# Patient Record
Sex: MALE | Race: WHITE
[De-identification: names, ages, dates, MRNs, and addresses within clinical notes are randomized per-mention and may not be internally consistent; named-entity substitution may affect disease eponyms.]

---

## 2019-07-25 ENCOUNTER — APPOINTMENT (OUTPATIENT)
Age: 84
End: 2019-07-25
Payer: MEDICARE

## 2019-07-25 VITALS
DIASTOLIC BLOOD PRESSURE: 80 MMHG | WEIGHT: 190 LBS | OXYGEN SATURATION: 95 % | HEART RATE: 75 BPM | RESPIRATION RATE: 18 BRPM | HEIGHT: 69 IN | TEMPERATURE: 98.3 F | BODY MASS INDEX: 28.14 KG/M2 | SYSTOLIC BLOOD PRESSURE: 173 MMHG

## 2019-07-25 DIAGNOSIS — Z86.79 PERSONAL HISTORY OF OTHER DISEASES OF THE CIRCULATORY SYSTEM: ICD-10-CM

## 2019-07-25 DIAGNOSIS — Z78.9 OTHER SPECIFIED HEALTH STATUS: ICD-10-CM

## 2019-07-25 DIAGNOSIS — Z86.39 PERSONAL HISTORY OF OTHER ENDOCRINE, NUTRITIONAL AND METABOLIC DISEASE: ICD-10-CM

## 2019-07-25 DIAGNOSIS — R82.71 BACTERIURIA: ICD-10-CM

## 2019-07-25 PROCEDURE — 51798 US URINE CAPACITY MEASURE: CPT

## 2019-07-25 PROCEDURE — 99204 OFFICE O/P NEW MOD 45 MIN: CPT | Mod: 25

## 2019-08-02 LAB
APPEARANCE: CLEAR
BACTERIA UR CULT: NORMAL
BACTERIA: NEGATIVE
BILIRUBIN URINE: NEGATIVE
BLOOD URINE: ABNORMAL
COLOR: YELLOW
GLUCOSE QUALITATIVE U: NEGATIVE
HYALINE CASTS: 0 /LPF
KETONES URINE: NEGATIVE
LEUKOCYTE ESTERASE URINE: NEGATIVE
MICROSCOPIC-UA: NORMAL
NITRITE URINE: NEGATIVE
PH URINE: 6
PROTEIN URINE: ABNORMAL
RED BLOOD CELLS URINE: 83 /HPF
SPECIFIC GRAVITY URINE: 1.03
SQUAMOUS EPITHELIAL CELLS: 1 /HPF
UROBILINOGEN URINE: NORMAL
WHITE BLOOD CELLS URINE: 6 /HPF

## 2019-08-07 PROBLEM — Z86.79 HISTORY OF HYPERTENSION: Status: RESOLVED | Noted: 2019-08-07 | Resolved: 2019-08-07

## 2019-08-07 PROBLEM — Z86.39 HISTORY OF HYPERLIPIDEMIA: Status: RESOLVED | Noted: 2019-08-07 | Resolved: 2019-08-07

## 2019-08-07 PROBLEM — Z78.9 NON-SMOKER: Status: ACTIVE | Noted: 2019-08-07

## 2019-08-07 RX ORDER — SIMVASTATIN 80 MG/1
TABLET, FILM COATED ORAL
Refills: 0 | Status: ACTIVE | COMMUNITY

## 2019-08-07 RX ORDER — AMLODIPINE BESYLATE 5 MG/1
TABLET ORAL
Refills: 0 | Status: ACTIVE | COMMUNITY

## 2019-08-07 NOTE — ASSESSMENT
[FreeTextEntry1] : 83 yo M with recurrent UTI. Pt denies any acute or bothersome LUTS. No elevated PVR, no abnormalities on Renal US. Unclear why he had recurrent UTI. Given that this appears to be asymptomatic bacteriuria, would not recommend further intervention or treatment unless pt develops symptomatic infection. If symptomatic UTI occurs, will perform cystoscopy.

## 2019-08-07 NOTE — HISTORY OF PRESENT ILLNESS
[FreeTextEntry1] : 84 year old man seen 07/25/2019 with complaint of UTI. This began 6/03/19, when bacteriuria was discovered by PCP on UCx. Enterococcus found. Bacteriuria has recurred despite abx treatment. Renal US (8/1/19) was negative for kidney stones or hydronephrosis.\par It is mild in severity as he denies any bothersome LUTS. He reports mild frequency, intermittency, and weak stream but these LUTS are mild, unchanged, and not bothersome. Nothing makes the symptoms better, nothing makes sx worse. It is associated with nothing.\par No hematuria, no dysuria, no straining. No incontinence. No fevers, no chills, no nausea, no vomiting, no flank pain. PVR 0 mL.\par \par No family history contributory to bacteriuria/UTI.

## 2019-09-05 ENCOUNTER — APPOINTMENT (OUTPATIENT)
Age: 84
End: 2019-09-05
Payer: MEDICARE

## 2019-09-05 PROCEDURE — 99213 OFFICE O/P EST LOW 20 MIN: CPT

## 2019-09-05 NOTE — HISTORY OF PRESENT ILLNESS
[FreeTextEntry1] : 84 year old man seen 07/25/2019 with complaint of UTI. This began 6/03/19, when bacteriuria was discovered by PCP on UCx. Enterococcus found. Bacteriuria has recurred despite abx treatment. Renal US (8/1/19) was negative for kidney stones or hydronephrosis.\par It is mild in severity as he denies any bothersome LUTS. He reports mild frequency, intermittency, and weak stream but these LUTS are mild, unchanged, and not bothersome. Nothing makes the symptoms better, nothing makes sx worse. It is associated with nothing.\par No hematuria, no dysuria, no straining. No incontinence. No fevers, no chills, no nausea, no vomiting, no flank pain. PVR 0 mL.\par \par No family history contributory to bacteriuria/UTI. \par \par 09/05/2019: Patient presents for follow up. He reports he has no complaints at this time. No hematuria, no dysuria, no frequency, no urgency, no hesitancy, no straining. No incontinence. No fevers, no chills, no nausea, no vomiting, no flank pain.\par

## 2019-09-05 NOTE — ASSESSMENT
[FreeTextEntry1] : 85 yo M with recurrent UTI. Pt denies any acute or bothersome LUTS. No elevated PVR, no abnormalities on Renal US. Unclear why he had recurrent UTI. Given that this appears to be asymptomatic bacteriuria, would not recommend further intervention or treatment unless pt develops symptomatic infection. \par Last UA showed Microscopic hematuria, discussed with patient this may be secondary to bacteruria, will repeat UA/UCx today. If UA positive for hematuria then will proceed with CTU and cystoscopy. \par \par We discussed that the differential diagnosis includes both benign and malignant conditions including renal stones, urinary tract infections, and cancer of the bladder or ureter or kidney. Cystoscopy was recommended to rule out pathology in the bladder. CT Urogram was recommended to evaluate for presence of nephroureteral stones or malignancies. Urinalysis and urine culture were recommended to check for urinary tract infection. Pt agrees and understands. \par \par

## 2019-09-06 LAB
APPEARANCE: CLEAR
BACTERIA: NEGATIVE
BILIRUBIN URINE: NEGATIVE
BLOOD URINE: NORMAL
COLOR: YELLOW
GLUCOSE QUALITATIVE U: NEGATIVE
HYALINE CASTS: 3 /LPF
KETONES URINE: NORMAL
LEUKOCYTE ESTERASE URINE: ABNORMAL
MICROSCOPIC-UA: NORMAL
NITRITE URINE: NEGATIVE
PH URINE: 6
PROTEIN URINE: ABNORMAL
RED BLOOD CELLS URINE: 7 /HPF
SPECIFIC GRAVITY URINE: 1.03
SQUAMOUS EPITHELIAL CELLS: 4 /HPF
UROBILINOGEN URINE: NORMAL
WHITE BLOOD CELLS URINE: 47 /HPF

## 2019-09-12 DIAGNOSIS — Z00.00 ENCOUNTER FOR GENERAL ADULT MEDICAL EXAMINATION W/OUT ABNORMAL FINDINGS: ICD-10-CM

## 2019-09-17 LAB — BACTERIA UR CULT: ABNORMAL

## 2020-03-05 ENCOUNTER — APPOINTMENT (OUTPATIENT)
Age: 85
End: 2020-03-05

## 2021-08-19 ENCOUNTER — OUTPATIENT (OUTPATIENT)
Dept: OUTPATIENT SERVICES | Facility: HOSPITAL | Age: 86
LOS: 1 days | End: 2021-08-19
Payer: MEDICARE

## 2021-08-19 ENCOUNTER — APPOINTMENT (OUTPATIENT)
Dept: UROLOGY | Facility: CLINIC | Age: 86
End: 2021-08-19
Payer: MEDICARE

## 2021-08-19 ENCOUNTER — APPOINTMENT (OUTPATIENT)
Dept: CT IMAGING | Facility: CLINIC | Age: 86
End: 2021-08-19
Payer: MEDICARE

## 2021-08-19 DIAGNOSIS — N13.30 UNSPECIFIED HYDRONEPHROSIS: ICD-10-CM

## 2021-08-19 PROCEDURE — 99213 OFFICE O/P EST LOW 20 MIN: CPT

## 2021-08-19 PROCEDURE — 74176 CT ABD & PELVIS W/O CONTRAST: CPT | Mod: 26

## 2021-08-19 PROCEDURE — 74176 CT ABD & PELVIS W/O CONTRAST: CPT

## 2021-08-20 NOTE — HISTORY OF PRESENT ILLNESS
[FreeTextEntry1] : 84 year old man seen 07/25/2019 with complaint of UTI. This began 6/03/19, when bacteriuria was discovered by PCP on UCx. Enterococcus found. Bacteriuria has recurred despite abx treatment. Renal US (8/1/19) was negative for kidney stones or hydronephrosis.\par It is mild in severity as he denies any bothersome LUTS. He reports mild frequency, intermittency, and weak stream but these LUTS are mild, unchanged, and not bothersome. Nothing makes the symptoms better, nothing makes sx worse. It is associated with nothing.\par No hematuria, no dysuria, no straining. No incontinence. No fevers, no chills, no nausea, no vomiting, no flank pain. PVR 0 mL.\par \par No family history contributory to bacteriuria/UTI. \par \par 09/05/2019: Patient presents for follow up. He reports he has no complaints at this time. No hematuria, no dysuria, no frequency, no urgency, no hesitancy, no straining. No incontinence. No fevers, no chills, no nausea, no vomiting, no flank pain.\par \par 08/19/2021: Patient presents for follow up. He was LTFU for 2 years. He was sent by PCP due to RBUS showing RIGHT hydronephrosis. RBUS done due to Cr 2.19 up from normal baseline. He denies flank pain or other complaints.

## 2021-08-20 NOTE — ASSESSMENT
[FreeTextEntry1] : 85 yo M with h/o RIGHT ureteral stone, now with hydronephrosis seen on RBUS, elevated Cr. Pt sent for STAT CT to evaluate for stone or other ureteral obstruction. \par \par

## 2021-08-23 ENCOUNTER — NON-APPOINTMENT (OUTPATIENT)
Age: 86
End: 2021-08-23

## 2021-09-13 ENCOUNTER — OUTPATIENT (OUTPATIENT)
Dept: OUTPATIENT SERVICES | Facility: HOSPITAL | Age: 86
LOS: 1 days | End: 2021-09-13
Payer: MEDICARE

## 2021-09-13 ENCOUNTER — RESULT REVIEW (OUTPATIENT)
Age: 86
End: 2021-09-13

## 2021-09-13 VITALS
RESPIRATION RATE: 18 BRPM | OXYGEN SATURATION: 100 % | HEIGHT: 68 IN | WEIGHT: 181.88 LBS | HEART RATE: 68 BPM | TEMPERATURE: 98 F

## 2021-09-13 DIAGNOSIS — Z98.890 OTHER SPECIFIED POSTPROCEDURAL STATES: Chronic | ICD-10-CM

## 2021-09-13 DIAGNOSIS — N13.30 UNSPECIFIED HYDRONEPHROSIS: ICD-10-CM

## 2021-09-13 DIAGNOSIS — Z96.641 PRESENCE OF RIGHT ARTIFICIAL HIP JOINT: Chronic | ICD-10-CM

## 2021-09-13 DIAGNOSIS — Z01.818 ENCOUNTER FOR OTHER PREPROCEDURAL EXAMINATION: ICD-10-CM

## 2021-09-13 DIAGNOSIS — Z96.653 PRESENCE OF ARTIFICIAL KNEE JOINT, BILATERAL: Chronic | ICD-10-CM

## 2021-09-13 LAB
ANION GAP SERPL CALC-SCNC: 7 MMOL/L — SIGNIFICANT CHANGE UP (ref 5–17)
APPEARANCE UR: CLEAR — SIGNIFICANT CHANGE UP
APTT BLD: 31.5 SEC — SIGNIFICANT CHANGE UP (ref 27.5–35.5)
BASOPHILS # BLD AUTO: 0.06 K/UL — SIGNIFICANT CHANGE UP (ref 0–0.2)
BASOPHILS NFR BLD AUTO: 0.7 % — SIGNIFICANT CHANGE UP (ref 0–2)
BILIRUB UR-MCNC: NEGATIVE — SIGNIFICANT CHANGE UP
BUN SERPL-MCNC: 35 MG/DL — HIGH (ref 7–23)
CALCIUM SERPL-MCNC: 9.4 MG/DL — SIGNIFICANT CHANGE UP (ref 8.5–10.1)
CHLORIDE SERPL-SCNC: 107 MMOL/L — SIGNIFICANT CHANGE UP (ref 96–108)
CO2 SERPL-SCNC: 27 MMOL/L — SIGNIFICANT CHANGE UP (ref 22–31)
COLOR SPEC: YELLOW — SIGNIFICANT CHANGE UP
CREAT SERPL-MCNC: 1.87 MG/DL — HIGH (ref 0.5–1.3)
DIFF PNL FLD: ABNORMAL
EOSINOPHIL # BLD AUTO: 0.37 K/UL — SIGNIFICANT CHANGE UP (ref 0–0.5)
EOSINOPHIL NFR BLD AUTO: 4.2 % — SIGNIFICANT CHANGE UP (ref 0–6)
GLUCOSE SERPL-MCNC: 96 MG/DL — SIGNIFICANT CHANGE UP (ref 70–99)
GLUCOSE UR QL: NEGATIVE — SIGNIFICANT CHANGE UP
HCT VFR BLD CALC: 44.3 % — SIGNIFICANT CHANGE UP (ref 39–50)
HGB BLD-MCNC: 15.2 G/DL — SIGNIFICANT CHANGE UP (ref 13–17)
IMM GRANULOCYTES NFR BLD AUTO: 0.3 % — SIGNIFICANT CHANGE UP (ref 0–1.5)
INR BLD: 1.04 RATIO — SIGNIFICANT CHANGE UP (ref 0.88–1.16)
KETONES UR-MCNC: NEGATIVE — SIGNIFICANT CHANGE UP
LEUKOCYTE ESTERASE UR-ACNC: ABNORMAL
LYMPHOCYTES # BLD AUTO: 1.79 K/UL — SIGNIFICANT CHANGE UP (ref 1–3.3)
LYMPHOCYTES # BLD AUTO: 20.2 % — SIGNIFICANT CHANGE UP (ref 13–44)
MCHC RBC-ENTMCNC: 31.4 PG — SIGNIFICANT CHANGE UP (ref 27–34)
MCHC RBC-ENTMCNC: 34.3 GM/DL — SIGNIFICANT CHANGE UP (ref 32–36)
MCV RBC AUTO: 91.5 FL — SIGNIFICANT CHANGE UP (ref 80–100)
MONOCYTES # BLD AUTO: 0.68 K/UL — SIGNIFICANT CHANGE UP (ref 0–0.9)
MONOCYTES NFR BLD AUTO: 7.7 % — SIGNIFICANT CHANGE UP (ref 2–14)
NEUTROPHILS # BLD AUTO: 5.92 K/UL — SIGNIFICANT CHANGE UP (ref 1.8–7.4)
NEUTROPHILS NFR BLD AUTO: 66.9 % — SIGNIFICANT CHANGE UP (ref 43–77)
NITRITE UR-MCNC: NEGATIVE — SIGNIFICANT CHANGE UP
PH UR: 6 — SIGNIFICANT CHANGE UP (ref 5–8)
PLATELET # BLD AUTO: 253 K/UL — SIGNIFICANT CHANGE UP (ref 150–400)
POTASSIUM SERPL-MCNC: 4 MMOL/L — SIGNIFICANT CHANGE UP (ref 3.5–5.3)
POTASSIUM SERPL-SCNC: 4 MMOL/L — SIGNIFICANT CHANGE UP (ref 3.5–5.3)
PROT UR-MCNC: 30 MG/DL
PROTHROM AB SERPL-ACNC: 12 SEC — SIGNIFICANT CHANGE UP (ref 10.6–13.6)
RBC # BLD: 4.84 M/UL — SIGNIFICANT CHANGE UP (ref 4.2–5.8)
RBC # FLD: 13.4 % — SIGNIFICANT CHANGE UP (ref 10.3–14.5)
SODIUM SERPL-SCNC: 141 MMOL/L — SIGNIFICANT CHANGE UP (ref 135–145)
SP GR SPEC: 1.02 — SIGNIFICANT CHANGE UP (ref 1.01–1.02)
UROBILINOGEN FLD QL: NEGATIVE — SIGNIFICANT CHANGE UP
WBC # BLD: 8.85 K/UL — SIGNIFICANT CHANGE UP (ref 3.8–10.5)
WBC # FLD AUTO: 8.85 K/UL — SIGNIFICANT CHANGE UP (ref 3.8–10.5)

## 2021-09-13 PROCEDURE — 80048 BASIC METABOLIC PNL TOTAL CA: CPT

## 2021-09-13 PROCEDURE — 86900 BLOOD TYPING SEROLOGIC ABO: CPT

## 2021-09-13 PROCEDURE — 87186 SC STD MICRODIL/AGAR DIL: CPT

## 2021-09-13 PROCEDURE — 86901 BLOOD TYPING SEROLOGIC RH(D): CPT

## 2021-09-13 PROCEDURE — 85610 PROTHROMBIN TIME: CPT

## 2021-09-13 PROCEDURE — G0463: CPT | Mod: 25

## 2021-09-13 PROCEDURE — 36415 COLL VENOUS BLD VENIPUNCTURE: CPT

## 2021-09-13 PROCEDURE — 86850 RBC ANTIBODY SCREEN: CPT

## 2021-09-13 PROCEDURE — 71046 X-RAY EXAM CHEST 2 VIEWS: CPT

## 2021-09-13 PROCEDURE — 81001 URINALYSIS AUTO W/SCOPE: CPT

## 2021-09-13 PROCEDURE — 85025 COMPLETE CBC W/AUTO DIFF WBC: CPT

## 2021-09-13 PROCEDURE — 85730 THROMBOPLASTIN TIME PARTIAL: CPT

## 2021-09-13 PROCEDURE — 87086 URINE CULTURE/COLONY COUNT: CPT

## 2021-09-13 PROCEDURE — 71046 X-RAY EXAM CHEST 2 VIEWS: CPT | Mod: 26

## 2021-09-13 NOTE — H&P PST ADULT - ASSESSMENT
86 year old man presents to PST for preprocedure exam. Patient is for planned cystoscopy, right ureteroscopy, possible biopsy, possible ureteral dilatation, right ureteral stent placement for kidney stones with Dr Chapman on 9/24.       Plan:  - PST instructions given ; NPO status instructions to be given by ASU .  - Pt instructed to take following meds with sip of water : amlodipine   - Pt instructed to take routine evening medications unless indicated .  -  Stop NSAIDS ( Aspirin Alev Motrin Mobic Diclofenac), herbal supplements , MVI , Vitamin fish oil 7 days prior to surgery  unless   directed by surgeon or cardiologist;   - Medical Optimization  with Dr Chapman   - EZ wash instructions given  - Labs EKG CXR as per surgeon request.   -  Pt instructed to self quarantine .  - Covid Testing scheduled

## 2021-09-13 NOTE — H&P PST ADULT - REASON FOR ADMISSION
cystoscopy, right ureteroscopy, possible biopsy, possible ureteral dilatation, right ureteral stent placement for kidney stones

## 2021-09-13 NOTE — H&P PST ADULT - NSICDXPASTSURGICALHX_GEN_ALL_CORE_FT
PAST SURGICAL HISTORY:  History of bilateral knee replacement     History of transurethral prostatectomy     S/P hip replacement, right

## 2021-09-14 DIAGNOSIS — Z01.818 ENCOUNTER FOR OTHER PREPROCEDURAL EXAMINATION: ICD-10-CM

## 2021-09-14 DIAGNOSIS — N13.30 UNSPECIFIED HYDRONEPHROSIS: ICD-10-CM

## 2021-09-20 ENCOUNTER — NON-APPOINTMENT (OUTPATIENT)
Age: 86
End: 2021-09-20

## 2021-09-21 ENCOUNTER — APPOINTMENT (OUTPATIENT)
Dept: DISASTER EMERGENCY | Facility: CLINIC | Age: 86
End: 2021-09-21

## 2021-09-21 DIAGNOSIS — Z01.818 ENCOUNTER FOR OTHER PREPROCEDURAL EXAMINATION: ICD-10-CM

## 2021-09-22 LAB — SARS-COV-2 N GENE NPH QL NAA+PROBE: NOT DETECTED

## 2021-09-23 RX ORDER — OXYCODONE HYDROCHLORIDE 5 MG/1
5 TABLET ORAL ONCE
Refills: 0 | Status: DISCONTINUED | OUTPATIENT
Start: 2021-09-24 | End: 2021-09-24

## 2021-09-23 RX ORDER — ONDANSETRON 8 MG/1
4 TABLET, FILM COATED ORAL ONCE
Refills: 0 | Status: DISCONTINUED | OUTPATIENT
Start: 2021-09-24 | End: 2021-09-24

## 2021-09-23 RX ORDER — FENTANYL CITRATE 50 UG/ML
50 INJECTION INTRAVENOUS
Refills: 0 | Status: DISCONTINUED | OUTPATIENT
Start: 2021-09-24 | End: 2021-09-24

## 2021-09-23 RX ORDER — SODIUM CHLORIDE 9 MG/ML
1000 INJECTION, SOLUTION INTRAVENOUS
Refills: 0 | Status: DISCONTINUED | OUTPATIENT
Start: 2021-09-24 | End: 2021-09-24

## 2021-09-24 ENCOUNTER — APPOINTMENT (OUTPATIENT)
Dept: UROLOGY | Facility: HOSPITAL | Age: 86
End: 2021-09-24

## 2021-09-24 ENCOUNTER — OUTPATIENT (OUTPATIENT)
Dept: INPATIENT UNIT | Facility: HOSPITAL | Age: 86
LOS: 1 days | Discharge: HOME | End: 2021-09-24
Payer: MEDICARE

## 2021-09-24 VITALS
HEART RATE: 70 BPM | RESPIRATION RATE: 14 BRPM | WEIGHT: 180.78 LBS | TEMPERATURE: 98 F | SYSTOLIC BLOOD PRESSURE: 171 MMHG | DIASTOLIC BLOOD PRESSURE: 84 MMHG | OXYGEN SATURATION: 99 % | HEIGHT: 68 IN

## 2021-09-24 VITALS
DIASTOLIC BLOOD PRESSURE: 84 MMHG | SYSTOLIC BLOOD PRESSURE: 170 MMHG | OXYGEN SATURATION: 97 % | TEMPERATURE: 98 F | RESPIRATION RATE: 16 BRPM | HEART RATE: 664 BPM

## 2021-09-24 DIAGNOSIS — Z96.641 PRESENCE OF RIGHT ARTIFICIAL HIP JOINT: Chronic | ICD-10-CM

## 2021-09-24 DIAGNOSIS — N13.30 UNSPECIFIED HYDRONEPHROSIS: ICD-10-CM

## 2021-09-24 DIAGNOSIS — N39.0 URINARY TRACT INFECTION, SITE NOT SPECIFIED: ICD-10-CM

## 2021-09-24 DIAGNOSIS — Z96.653 PRESENCE OF ARTIFICIAL KNEE JOINT, BILATERAL: Chronic | ICD-10-CM

## 2021-09-24 DIAGNOSIS — Z98.890 OTHER SPECIFIED POSTPROCEDURAL STATES: Chronic | ICD-10-CM

## 2021-09-24 PROCEDURE — C2617: CPT

## 2021-09-24 PROCEDURE — 74420 UROGRAPHY RTRGR +-KUB: CPT | Mod: 26,RT

## 2021-09-24 PROCEDURE — 76000 FLUOROSCOPY <1 HR PHYS/QHP: CPT

## 2021-09-24 PROCEDURE — 52332 CYSTOSCOPY AND TREATMENT: CPT | Mod: RT

## 2021-09-24 PROCEDURE — C1769: CPT

## 2021-09-24 PROCEDURE — 52351 CYSTOURETERO & OR PYELOSCOPE: CPT | Mod: RT

## 2021-09-24 RX ORDER — CIPROFLOXACIN LACTATE 400MG/40ML
1 VIAL (ML) INTRAVENOUS
Qty: 10 | Refills: 0
Start: 2021-09-24 | End: 2021-09-28

## 2021-09-24 RX ORDER — TRAMADOL HYDROCHLORIDE 50 MG/1
1 TABLET ORAL
Qty: 12 | Refills: 0
Start: 2021-09-24 | End: 2021-09-26

## 2021-09-24 RX ORDER — SODIUM CHLORIDE 9 MG/ML
3 INJECTION INTRAMUSCULAR; INTRAVENOUS; SUBCUTANEOUS EVERY 8 HOURS
Refills: 0 | Status: DISCONTINUED | OUTPATIENT
Start: 2021-09-24 | End: 2021-09-24

## 2021-09-24 RX ORDER — TAMSULOSIN HYDROCHLORIDE 0.4 MG/1
1 CAPSULE ORAL
Qty: 30 | Refills: 0
Start: 2021-09-24 | End: 2021-10-23

## 2021-09-24 RX ORDER — FAMOTIDINE 10 MG/ML
20 INJECTION INTRAVENOUS ONCE
Refills: 0 | Status: COMPLETED | OUTPATIENT
Start: 2021-09-24 | End: 2021-09-24

## 2021-09-24 RX ORDER — ACETAMINOPHEN 500 MG
975 TABLET ORAL ONCE
Refills: 0 | Status: COMPLETED | OUTPATIENT
Start: 2021-09-24 | End: 2021-09-24

## 2021-09-24 RX ORDER — PHENAZOPYRIDINE HCL 100 MG
200 TABLET ORAL ONCE
Refills: 0 | Status: COMPLETED | OUTPATIENT
Start: 2021-09-24 | End: 2021-09-24

## 2021-09-24 RX ADMIN — FAMOTIDINE 20 MILLIGRAM(S): 10 INJECTION INTRAVENOUS at 11:32

## 2021-09-24 RX ADMIN — Medication 975 MILLIGRAM(S): at 11:32

## 2021-09-24 RX ADMIN — Medication 200 MILLIGRAM(S): at 14:10

## 2021-09-24 NOTE — ASU PATIENT PROFILE, ADULT - NSICDXPASTMEDICALHX_GEN_ALL_CORE_FT
PAST MEDICAL HISTORY:  COVID-19 vaccine series completed moderna- march 2021    Hypercholesteremia     Hypertension     White coat syndrome with hypertension

## 2021-09-24 NOTE — ASU DISCHARGE PLAN (ADULT/PEDIATRIC) - CARE PROVIDER_API CALL
Thomas Chapman)  Urology  [address], [address] [place] [address]  Phone: [phone]  Fax: [phone]  Follow Up Time: 1 week

## 2021-09-24 NOTE — BRIEF OPERATIVE NOTE - NSICDXBRIEFPROCEDURE_GEN_ALL_CORE_FT
PROCEDURES:  Ureteroscopy 24-Sep-2021 13:27:31  Thomas Chapman  Cystoscopy with stent placement 24-Sep-2021 13:27:38  Thomas Chapman  Retrograde pyelogram 24-Sep-2021 13:27:50  Thomas Chapman

## 2021-09-28 ENCOUNTER — APPOINTMENT (OUTPATIENT)
Dept: UROLOGY | Facility: CLINIC | Age: 86
End: 2021-09-28
Payer: MEDICARE

## 2021-09-28 VITALS
HEART RATE: 78 BPM | SYSTOLIC BLOOD PRESSURE: 146 MMHG | TEMPERATURE: 97.7 F | OXYGEN SATURATION: 100 % | DIASTOLIC BLOOD PRESSURE: 77 MMHG

## 2021-09-28 PROBLEM — Z92.29 PERSONAL HISTORY OF OTHER DRUG THERAPY: Chronic | Status: ACTIVE | Noted: 2021-09-13

## 2021-09-28 PROBLEM — E78.00 PURE HYPERCHOLESTEROLEMIA, UNSPECIFIED: Chronic | Status: ACTIVE | Noted: 2021-09-13

## 2021-09-28 PROBLEM — I10 ESSENTIAL (PRIMARY) HYPERTENSION: Chronic | Status: ACTIVE | Noted: 2021-09-13

## 2021-09-28 PROCEDURE — 99213 OFFICE O/P EST LOW 20 MIN: CPT

## 2021-09-28 NOTE — HISTORY OF PRESENT ILLNESS
[FreeTextEntry1] : 84 year old man seen 07/25/2019 with complaint of UTI. This began 6/03/19, when bacteriuria was discovered by PCP on UCx. Enterococcus found. Bacteriuria has recurred despite abx treatment. Renal US (8/1/19) was negative for kidney stones or hydronephrosis.\par It is mild in severity as he denies any bothersome LUTS. He reports mild frequency, intermittency, and weak stream but these LUTS are mild, unchanged, and not bothersome. Nothing makes the symptoms better, nothing makes sx worse. It is associated with nothing.\par No hematuria, no dysuria, no straining. No incontinence. No fevers, no chills, no nausea, no vomiting, no flank pain. PVR 0 mL.\par \par No family history contributory to bacteriuria/UTI. \par \par 09/05/2019: Patient presents for follow up. He reports he has no complaints at this time. No hematuria, no dysuria, no frequency, no urgency, no hesitancy, no straining. No incontinence. No fevers, no chills, no nausea, no vomiting, no flank pain.\par \par 08/19/2021: Patient presents for follow up. He was LTFU for 2 years. He was sent by PCP due to RBUS showing RIGHT hydronephrosis. RBUS done due to Cr 2.19 up from normal baseline. He denies flank pain or other complaints.  \par \par 09/28/2021: Patient presents for follow up. he was taken to OR for possible RIGHT ureteroscopy, ureteral dilation but ureteral stricture was too tight to allow the passage of scope. RIGHT ureteral stent was placed. He reports some gross hematuria as well as urgency. Otherwise doing well post op.

## 2021-09-28 NOTE — ASSESSMENT
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 Discussed treatment options include continued management with indwelling stents and routine changes, or reattempt at dilation. Pt agrees to ureteroscopy, laser or dilation treatment of ureteral stricture but he would like to wait a few months. WIll plan for 3 months. Will give samples of myrbetriq 50 mg and will Rx if pt finds it helpful for urgency. RTO in 6 weeks for sx check. \par \par

## 2021-10-01 DIAGNOSIS — Z90.79 ACQUIRED ABSENCE OF OTHER GENITAL ORGAN(S): ICD-10-CM

## 2021-10-01 DIAGNOSIS — E78.5 HYPERLIPIDEMIA, UNSPECIFIED: ICD-10-CM

## 2021-10-01 DIAGNOSIS — I77.9 DISORDER OF ARTERIES AND ARTERIOLES, UNSPECIFIED: ICD-10-CM

## 2021-10-01 DIAGNOSIS — Z96.649 PRESENCE OF UNSPECIFIED ARTIFICIAL HIP JOINT: ICD-10-CM

## 2021-10-01 DIAGNOSIS — N13.1 HYDRONEPHROSIS WITH URETERAL STRICTURE, NOT ELSEWHERE CLASSIFIED: ICD-10-CM

## 2021-10-01 DIAGNOSIS — Z96.653 PRESENCE OF ARTIFICIAL KNEE JOINT, BILATERAL: ICD-10-CM

## 2021-10-01 DIAGNOSIS — I10 ESSENTIAL (PRIMARY) HYPERTENSION: ICD-10-CM

## 2021-10-01 DIAGNOSIS — Z79.82 LONG TERM (CURRENT) USE OF ASPIRIN: ICD-10-CM

## 2021-10-01 DIAGNOSIS — N40.0 BENIGN PROSTATIC HYPERPLASIA WITHOUT LOWER URINARY TRACT SYMPTOMS: ICD-10-CM

## 2021-11-10 ENCOUNTER — NON-APPOINTMENT (OUTPATIENT)
Age: 86
End: 2021-11-10

## 2021-11-18 ENCOUNTER — APPOINTMENT (OUTPATIENT)
Dept: UROLOGY | Facility: CLINIC | Age: 86
End: 2021-11-18
Payer: MEDICARE

## 2021-11-18 DIAGNOSIS — N13.30 UNSPECIFIED HYDRONEPHROSIS: ICD-10-CM

## 2021-11-18 PROCEDURE — 99213 OFFICE O/P EST LOW 20 MIN: CPT

## 2021-11-18 RX ORDER — NITROFURANTOIN (MONOHYDRATE/MACROCRYSTALS) 25; 75 MG/1; MG/1
100 CAPSULE ORAL
Qty: 14 | Refills: 3 | Status: ACTIVE | COMMUNITY
Start: 2019-09-12 | End: 1900-01-01

## 2021-11-19 PROBLEM — N13.30 HYDRONEPHROSIS, RIGHT: Status: ACTIVE | Noted: 2021-08-19

## 2021-11-19 NOTE — HISTORY OF PRESENT ILLNESS
[FreeTextEntry1] : 84 year old man seen 07/25/2019 with complaint of UTI. This began 6/03/19, when bacteriuria was discovered by PCP on UCx. Enterococcus found. Bacteriuria has recurred despite abx treatment. Renal US (8/1/19) was negative for kidney stones or hydronephrosis.\par It is mild in severity as he denies any bothersome LUTS. He reports mild frequency, intermittency, and weak stream but these LUTS are mild, unchanged, and not bothersome. Nothing makes the symptoms better, nothing makes sx worse. It is associated with nothing.\par No hematuria, no dysuria, no straining. No incontinence. No fevers, no chills, no nausea, no vomiting, no flank pain. PVR 0 mL.\par \par No family history contributory to bacteriuria/UTI. \par \par 09/05/2019: Patient presents for follow up. He reports he has no complaints at this time. No hematuria, no dysuria, no frequency, no urgency, no hesitancy, no straining. No incontinence. No fevers, no chills, no nausea, no vomiting, no flank pain.\par \par 08/19/2021: Patient presents for follow up. He was LTFU for 2 years. He was sent by PCP due to RBUS showing RIGHT hydronephrosis. RBUS done due to Cr 2.19 up from normal baseline. He denies flank pain or other complaints.  \par \par 09/28/2021: Patient presents for follow up. he was taken to OR for possible RIGHT ureteroscopy, ureteral dilation but ureteral stricture was too tight to allow the passage of scope. RIGHT ureteral stent was placed. He reports some gross hematuria as well as urgency. Otherwise doing well post op. \par \par 11/18/2021: Patient presents for follow up. He reports urgency. UCx was positive. Serum Cr baseline. No new complaints.

## 2021-11-19 NOTE — ASSESSMENT
[FreeTextEntry1] : 87 yo M with h/o RIGHT ureteral stone, now with hydronephrosis and RIGHT ureteral stricture s/p RIGHT ureteral stent placement. Discussed treatment options include continued management with indwelling stents and routine changes, or reattempt at dilation. Pt agrees to ureteroscopy, laser or dilation treatment of ureteral stricture. Will book. \par \par For UTI, will Rx abx.

## 2021-11-22 ENCOUNTER — NON-APPOINTMENT (OUTPATIENT)
Age: 86
End: 2021-11-22

## 2021-12-01 ENCOUNTER — NON-APPOINTMENT (OUTPATIENT)
Age: 86
End: 2021-12-01

## 2021-12-01 PROBLEM — N39.0 RECURRENT UTI (URINARY TRACT INFECTION): Status: ACTIVE | Noted: 2019-07-25

## 2021-12-13 ENCOUNTER — NON-APPOINTMENT (OUTPATIENT)
Age: 86
End: 2021-12-13

## 2022-01-24 ENCOUNTER — OUTPATIENT (OUTPATIENT)
Dept: OUTPATIENT SERVICES | Facility: HOSPITAL | Age: 87
LOS: 1 days | End: 2022-01-24
Payer: COMMERCIAL

## 2022-01-24 VITALS
TEMPERATURE: 98 F | HEIGHT: 66 IN | RESPIRATION RATE: 16 BRPM | SYSTOLIC BLOOD PRESSURE: 163 MMHG | OXYGEN SATURATION: 99 % | HEART RATE: 73 BPM | WEIGHT: 174.17 LBS | DIASTOLIC BLOOD PRESSURE: 77 MMHG

## 2022-01-24 DIAGNOSIS — H26.9 UNSPECIFIED CATARACT: Chronic | ICD-10-CM

## 2022-01-24 DIAGNOSIS — Z98.890 OTHER SPECIFIED POSTPROCEDURAL STATES: Chronic | ICD-10-CM

## 2022-01-24 DIAGNOSIS — N13.30 UNSPECIFIED HYDRONEPHROSIS: ICD-10-CM

## 2022-01-24 DIAGNOSIS — Z96.641 PRESENCE OF RIGHT ARTIFICIAL HIP JOINT: Chronic | ICD-10-CM

## 2022-01-24 DIAGNOSIS — Z01.818 ENCOUNTER FOR OTHER PREPROCEDURAL EXAMINATION: ICD-10-CM

## 2022-01-24 DIAGNOSIS — Z96.653 PRESENCE OF ARTIFICIAL KNEE JOINT, BILATERAL: Chronic | ICD-10-CM

## 2022-01-24 LAB
ANION GAP SERPL CALC-SCNC: 5 MMOL/L — SIGNIFICANT CHANGE UP (ref 5–17)
APPEARANCE UR: CLEAR — SIGNIFICANT CHANGE UP
APTT BLD: 32.4 SEC — SIGNIFICANT CHANGE UP (ref 27.5–35.5)
BASOPHILS # BLD AUTO: 0.05 K/UL — SIGNIFICANT CHANGE UP (ref 0–0.2)
BASOPHILS NFR BLD AUTO: 0.7 % — SIGNIFICANT CHANGE UP (ref 0–2)
BILIRUB UR-MCNC: NEGATIVE — SIGNIFICANT CHANGE UP
BUN SERPL-MCNC: 29 MG/DL — HIGH (ref 7–23)
CALCIUM SERPL-MCNC: 9.1 MG/DL — SIGNIFICANT CHANGE UP (ref 8.5–10.1)
CHLORIDE SERPL-SCNC: 109 MMOL/L — HIGH (ref 96–108)
CO2 SERPL-SCNC: 26 MMOL/L — SIGNIFICANT CHANGE UP (ref 22–31)
COLOR SPEC: YELLOW — SIGNIFICANT CHANGE UP
CREAT SERPL-MCNC: 1.85 MG/DL — HIGH (ref 0.5–1.3)
DIFF PNL FLD: ABNORMAL
EOSINOPHIL # BLD AUTO: 0.34 K/UL — SIGNIFICANT CHANGE UP (ref 0–0.5)
EOSINOPHIL NFR BLD AUTO: 4.4 % — SIGNIFICANT CHANGE UP (ref 0–6)
GLUCOSE SERPL-MCNC: 118 MG/DL — HIGH (ref 70–99)
GLUCOSE UR QL: NEGATIVE — SIGNIFICANT CHANGE UP
HCT VFR BLD CALC: 41.4 % — SIGNIFICANT CHANGE UP (ref 39–50)
HGB BLD-MCNC: 13.5 G/DL — SIGNIFICANT CHANGE UP (ref 13–17)
IMM GRANULOCYTES NFR BLD AUTO: 0.1 % — SIGNIFICANT CHANGE UP (ref 0–1.5)
INR BLD: 1.04 RATIO — SIGNIFICANT CHANGE UP (ref 0.88–1.16)
KETONES UR-MCNC: NEGATIVE — SIGNIFICANT CHANGE UP
LEUKOCYTE ESTERASE UR-ACNC: ABNORMAL
LYMPHOCYTES # BLD AUTO: 1.28 K/UL — SIGNIFICANT CHANGE UP (ref 1–3.3)
LYMPHOCYTES # BLD AUTO: 16.7 % — SIGNIFICANT CHANGE UP (ref 13–44)
MCHC RBC-ENTMCNC: 30.2 PG — SIGNIFICANT CHANGE UP (ref 27–34)
MCHC RBC-ENTMCNC: 32.6 GM/DL — SIGNIFICANT CHANGE UP (ref 32–36)
MCV RBC AUTO: 92.6 FL — SIGNIFICANT CHANGE UP (ref 80–100)
MONOCYTES # BLD AUTO: 0.68 K/UL — SIGNIFICANT CHANGE UP (ref 0–0.9)
MONOCYTES NFR BLD AUTO: 8.9 % — SIGNIFICANT CHANGE UP (ref 2–14)
NEUTROPHILS # BLD AUTO: 5.32 K/UL — SIGNIFICANT CHANGE UP (ref 1.8–7.4)
NEUTROPHILS NFR BLD AUTO: 69.2 % — SIGNIFICANT CHANGE UP (ref 43–77)
NITRITE UR-MCNC: NEGATIVE — SIGNIFICANT CHANGE UP
PH UR: 7 — SIGNIFICANT CHANGE UP (ref 5–8)
PLATELET # BLD AUTO: 334 K/UL — SIGNIFICANT CHANGE UP (ref 150–400)
POTASSIUM SERPL-MCNC: 3.7 MMOL/L — SIGNIFICANT CHANGE UP (ref 3.5–5.3)
POTASSIUM SERPL-SCNC: 3.7 MMOL/L — SIGNIFICANT CHANGE UP (ref 3.5–5.3)
PROT UR-MCNC: 100
PROTHROM AB SERPL-ACNC: 12 SEC — SIGNIFICANT CHANGE UP (ref 10.6–13.6)
RBC # BLD: 4.47 M/UL — SIGNIFICANT CHANGE UP (ref 4.2–5.8)
RBC # FLD: 13.2 % — SIGNIFICANT CHANGE UP (ref 10.3–14.5)
SODIUM SERPL-SCNC: 140 MMOL/L — SIGNIFICANT CHANGE UP (ref 135–145)
SP GR SPEC: 1.01 — SIGNIFICANT CHANGE UP (ref 1.01–1.02)
UROBILINOGEN FLD QL: NEGATIVE — SIGNIFICANT CHANGE UP
WBC # BLD: 7.68 K/UL — SIGNIFICANT CHANGE UP (ref 3.8–10.5)
WBC # FLD AUTO: 7.68 K/UL — SIGNIFICANT CHANGE UP (ref 3.8–10.5)

## 2022-01-24 PROCEDURE — 93005 ELECTROCARDIOGRAM TRACING: CPT

## 2022-01-24 PROCEDURE — 85025 COMPLETE CBC W/AUTO DIFF WBC: CPT

## 2022-01-24 PROCEDURE — 86850 RBC ANTIBODY SCREEN: CPT

## 2022-01-24 PROCEDURE — 36415 COLL VENOUS BLD VENIPUNCTURE: CPT

## 2022-01-24 PROCEDURE — 93010 ELECTROCARDIOGRAM REPORT: CPT

## 2022-01-24 PROCEDURE — 85730 THROMBOPLASTIN TIME PARTIAL: CPT

## 2022-01-24 PROCEDURE — 85610 PROTHROMBIN TIME: CPT

## 2022-01-24 PROCEDURE — 86900 BLOOD TYPING SEROLOGIC ABO: CPT

## 2022-01-24 PROCEDURE — 87186 SC STD MICRODIL/AGAR DIL: CPT

## 2022-01-24 PROCEDURE — 81001 URINALYSIS AUTO W/SCOPE: CPT

## 2022-01-24 PROCEDURE — G0463: CPT | Mod: 25

## 2022-01-24 PROCEDURE — 80048 BASIC METABOLIC PNL TOTAL CA: CPT

## 2022-01-24 PROCEDURE — 86901 BLOOD TYPING SEROLOGIC RH(D): CPT

## 2022-01-24 PROCEDURE — 87086 URINE CULTURE/COLONY COUNT: CPT

## 2022-01-24 NOTE — H&P PST ADULT - HISTORY OF PRESENT ILLNESS
87 y.o WD, WN male presents to PST with hx of right ureteral stricture. Patient reports his PCP noted decreased kidney function during annual physical.  87 y.o WD, WN male presents to PST with hx of right ureteral stricture. Patient reports his PCP noted decreased kidney function during annual physical. He has followed with urology and states diagnostics revealed a "narrowing" of his right ureter. He had a cystoscopy with attempted laser of stricture and stent placement. Patient currently denies dysuria or hematuria. He is now scheduled for Cystoscopy, Right Ureteroscopy, Laser Incision of Ureteral Stricture, Balloon Dilatation of Ureteral Stricture, Right Ureteral Stent Exchange  87 y.o WD, WN male presents to PST with hx of right ureteral stricture. Patient reports recurrent UTI's and  decreased kidney function noted by his PCP . He has followed with urology and states diagnostics revealed a "narrowing" of his right ureter. He had a cystoscopy with attempted laser of stricture and stent placement. Patient currently denies dysuria or hematuria. He is now scheduled for Cystoscopy, Right Ureteroscopy, Laser Incision of Ureteral Stricture, Balloon Dilatation of Ureteral Stricture, Right Ureteral Stent Exchange  87 y.o WD, WN male presents to PST with hx of right ureteral stricture. Patient reports recurrent UTI's and  decreased kidney function noted by his PCP . He has followed with urology and states diagnostics revealed a "narrowing" of his right ureter. He had a cystoscopy with attempted laser of stricture and stent placement in September. Patient currently denies dysuria or hematuria. He is now scheduled for Cystoscopy, Right Ureteroscopy, Laser Incision of Ureteral Stricture, Balloon Dilatation of Ureteral Stricture, Right Ureteral Stent Exchange

## 2022-01-24 NOTE — H&P PST ADULT - NSICDXPASTMEDICALHX_GEN_ALL_CORE_FT
PAST MEDICAL HISTORY:  COVID-19 vaccine series completed moderna- march 2021    Hypercholesteremia     Hypertension     Ureteral stricture, right      PAST MEDICAL HISTORY:  Arthritis     COVID-19 vaccine series completed moderna- march 2021    Hypercholesteremia     Hypertension     Ureteral stricture, right

## 2022-01-24 NOTE — H&P PST ADULT - NSICDXPASTSURGICALHX_GEN_ALL_CORE_FT
PAST SURGICAL HISTORY:  Bilateral cataracts     History of bilateral knee replacement 2006, 2008    History of cystoscopy Right Ureteroscopy, stent insertion ---9/2021    History of strabismus surgery Bilateral as a child    History of transurethral prostatectomy 2010    S/P hip replacement, right 2010

## 2022-01-24 NOTE — H&P PST ADULT - NSICDXFAMILYHX_GEN_ALL_CORE_FT
FAMILY HISTORY:  FH: lung cancer, Age 63,     Mother  Still living? Unknown  FH: breast cancer, Age at diagnosis: Age Unknown

## 2022-01-24 NOTE — H&P PST ADULT - ASSESSMENT
87 y.o male scheduled for  87 y.o male scheduled for  Cystoscopy, Right Ureteroscopy, Laser Incision of Ureteral Stricture, Balloon Dilatation of Ureteral Stricture, Right Ureteral Stent Exchange   Plan  1. Stop all NSAIDS, herbal supplements and vitamins for 7 days.  2. NPO at midnight.  3. Take the following medications Norvasc with small sips of water on the morning of your procedure/surgery.  4. Labs, EKG, CXR  as per surgeon  5. PMD/Cardiology Javier Chapman  visit for optimization prior to surgery as per surgeon  6. COVID swab appt: 1/30/2022

## 2022-01-25 DIAGNOSIS — Z01.818 ENCOUNTER FOR OTHER PREPROCEDURAL EXAMINATION: ICD-10-CM

## 2022-01-25 DIAGNOSIS — N13.30 UNSPECIFIED HYDRONEPHROSIS: ICD-10-CM

## 2022-01-26 ENCOUNTER — NON-APPOINTMENT (OUTPATIENT)
Age: 87
End: 2022-01-26

## 2022-01-28 ENCOUNTER — NON-APPOINTMENT (OUTPATIENT)
Age: 87
End: 2022-01-28

## 2022-01-28 RX ORDER — LEVOFLOXACIN 250 MG/1
250 TABLET, FILM COATED ORAL DAILY
Qty: 7 | Refills: 0 | Status: ACTIVE | COMMUNITY
Start: 2021-12-13 | End: 1900-01-01

## 2022-02-02 ENCOUNTER — APPOINTMENT (OUTPATIENT)
Dept: UROLOGY | Facility: HOSPITAL | Age: 87
End: 2022-02-02

## 2022-02-02 ENCOUNTER — OUTPATIENT (OUTPATIENT)
Dept: INPATIENT UNIT | Facility: HOSPITAL | Age: 87
LOS: 1 days | Discharge: HOME | End: 2022-02-02
Payer: MEDICARE

## 2022-02-02 VITALS
TEMPERATURE: 98 F | DIASTOLIC BLOOD PRESSURE: 71 MMHG | HEART RATE: 74 BPM | RESPIRATION RATE: 14 BRPM | SYSTOLIC BLOOD PRESSURE: 154 MMHG | OXYGEN SATURATION: 96 %

## 2022-02-02 VITALS
SYSTOLIC BLOOD PRESSURE: 160 MMHG | WEIGHT: 174.17 LBS | TEMPERATURE: 97 F | OXYGEN SATURATION: 100 % | DIASTOLIC BLOOD PRESSURE: 85 MMHG | HEART RATE: 72 BPM | HEIGHT: 66 IN | RESPIRATION RATE: 16 BRPM

## 2022-02-02 DIAGNOSIS — N13.30 UNSPECIFIED HYDRONEPHROSIS: ICD-10-CM

## 2022-02-02 DIAGNOSIS — Z96.653 PRESENCE OF ARTIFICIAL KNEE JOINT, BILATERAL: Chronic | ICD-10-CM

## 2022-02-02 DIAGNOSIS — Z98.890 OTHER SPECIFIED POSTPROCEDURAL STATES: Chronic | ICD-10-CM

## 2022-02-02 DIAGNOSIS — H26.9 UNSPECIFIED CATARACT: Chronic | ICD-10-CM

## 2022-02-02 DIAGNOSIS — Z96.641 PRESENCE OF RIGHT ARTIFICIAL HIP JOINT: Chronic | ICD-10-CM

## 2022-02-02 DIAGNOSIS — N13.5 CROSSING VESSEL AND STRICTURE OF URETER WITHOUT HYDRONEPHROSIS: ICD-10-CM

## 2022-02-02 PROCEDURE — C2617: CPT

## 2022-02-02 PROCEDURE — 74420 UROGRAPHY RTRGR +-KUB: CPT | Mod: 26,RT

## 2022-02-02 PROCEDURE — 76000 FLUOROSCOPY <1 HR PHYS/QHP: CPT

## 2022-02-02 PROCEDURE — 52332 CYSTOSCOPY AND TREATMENT: CPT | Mod: RT

## 2022-02-02 PROCEDURE — 52344 CYSTO/URETERO STRICTURE TX: CPT | Mod: RT

## 2022-02-02 PROCEDURE — C1769: CPT

## 2022-02-02 RX ORDER — ONDANSETRON 8 MG/1
4 TABLET, FILM COATED ORAL ONCE
Refills: 0 | Status: DISCONTINUED | OUTPATIENT
Start: 2022-02-02 | End: 2022-02-02

## 2022-02-02 RX ORDER — SODIUM CHLORIDE 9 MG/ML
1000 INJECTION, SOLUTION INTRAVENOUS
Refills: 0 | Status: DISCONTINUED | OUTPATIENT
Start: 2022-02-02 | End: 2022-02-02

## 2022-02-02 RX ORDER — TRAMADOL HYDROCHLORIDE 50 MG/1
1 TABLET ORAL
Qty: 12 | Refills: 0
Start: 2022-02-02 | End: 2022-02-04

## 2022-02-02 RX ORDER — OXYCODONE HYDROCHLORIDE 5 MG/1
5 TABLET ORAL ONCE
Refills: 0 | Status: DISCONTINUED | OUTPATIENT
Start: 2022-02-02 | End: 2022-02-02

## 2022-02-02 RX ORDER — FENTANYL CITRATE 50 UG/ML
50 INJECTION INTRAVENOUS
Refills: 0 | Status: DISCONTINUED | OUTPATIENT
Start: 2022-02-02 | End: 2022-02-02

## 2022-02-02 RX ORDER — OXYBUTYNIN CHLORIDE 5 MG
1 TABLET ORAL
Qty: 21 | Refills: 0
Start: 2022-02-02 | End: 2022-02-08

## 2022-02-02 RX ORDER — OXYBUTYNIN CHLORIDE 5 MG
5 TABLET ORAL ONCE
Refills: 0 | Status: COMPLETED | OUTPATIENT
Start: 2022-02-02 | End: 2022-02-02

## 2022-02-02 RX ORDER — HYDRALAZINE HCL 50 MG
5 TABLET ORAL ONCE
Refills: 0 | Status: COMPLETED | OUTPATIENT
Start: 2022-02-02 | End: 2022-02-02

## 2022-02-02 RX ORDER — PHENAZOPYRIDINE HCL 100 MG
1 TABLET ORAL
Qty: 9 | Refills: 0
Start: 2022-02-02 | End: 2022-02-04

## 2022-02-02 RX ORDER — PHENAZOPYRIDINE HCL 100 MG
200 TABLET ORAL ONCE
Refills: 0 | Status: COMPLETED | OUTPATIENT
Start: 2022-02-02 | End: 2022-02-02

## 2022-02-02 RX ORDER — ACETAMINOPHEN 500 MG
975 TABLET ORAL ONCE
Refills: 0 | Status: COMPLETED | OUTPATIENT
Start: 2022-02-02 | End: 2022-02-02

## 2022-02-02 RX ORDER — FAMOTIDINE 10 MG/ML
20 INJECTION INTRAVENOUS ONCE
Refills: 0 | Status: COMPLETED | OUTPATIENT
Start: 2022-02-02 | End: 2022-02-02

## 2022-02-02 RX ADMIN — Medication 975 MILLIGRAM(S): at 12:14

## 2022-02-02 RX ADMIN — Medication 5 MILLIGRAM(S): at 16:40

## 2022-02-02 RX ADMIN — Medication 200 MILLIGRAM(S): at 16:40

## 2022-02-02 RX ADMIN — FAMOTIDINE 20 MILLIGRAM(S): 10 INJECTION INTRAVENOUS at 12:14

## 2022-02-02 RX ADMIN — SODIUM CHLORIDE 100 MILLILITER(S): 9 INJECTION, SOLUTION INTRAVENOUS at 16:42

## 2022-02-02 RX ADMIN — FENTANYL CITRATE 50 MICROGRAM(S): 50 INJECTION INTRAVENOUS at 16:34

## 2022-02-02 RX ADMIN — FENTANYL CITRATE 50 MICROGRAM(S): 50 INJECTION INTRAVENOUS at 17:22

## 2022-02-02 RX ADMIN — Medication 5 MILLIGRAM(S): at 17:16

## 2022-02-02 RX ADMIN — Medication 975 MILLIGRAM(S): at 12:15

## 2022-02-02 NOTE — ASU PREOP CHECKLIST - HEIGHT IN FEET
Vitals remained stable, afebrile. No complaints of pain. Incision WNL. Ambulating, eating, and voiding well. Prescription given to family. IV removed and site wrapped. Discussed d/c instructions with pt and family, stated understanding. AVS given to pt  
5

## 2022-02-02 NOTE — ASU DISCHARGE PLAN (ADULT/PEDIATRIC) - CARE PROVIDER_API CALL
Thomas Chapman)  Urology  [address], [address] [place] [address]  Phone: [phone]  Fax: [phone]  Follow Up Time: 1 week

## 2022-02-02 NOTE — ASU PATIENT PROFILE, ADULT - NSICDXPASTMEDICALHX_GEN_ALL_CORE_FT
PAST MEDICAL HISTORY:  Arthritis     COVID-19 vaccine series completed moderna- march 2021    Hypercholesteremia     Hypertension     Ureteral stricture, right

## 2022-02-02 NOTE — ASU PATIENT PROFILE, ADULT - REASON FOR ADMISSION, PROFILE
Cystoscopy,Right ureteroscopy laser incision of ureteral stricture ballon dilitation  and ureteral stent exchange

## 2022-02-02 NOTE — BRIEF OPERATIVE NOTE - NSICDXBRIEFPROCEDURE_GEN_ALL_CORE_FT
PROCEDURES:  Cystourethroscopy, with ureteroscopy and ureteral stricture treatment 02-Feb-2022 16:24:13  Thomas Chapman  XR pyelogram retrograde right 02-Feb-2022 16:24:34  Thomas Chapman

## 2022-02-02 NOTE — ASU DISCHARGE PLAN (ADULT/PEDIATRIC) - NS MD DC FALL RISK RISK
For information on Fall & Injury Prevention, visit: [email or web address] OR  [email or web address] OR  https://www.cdc.gov/steadi/patient.html

## 2022-02-08 DIAGNOSIS — Z96.641 PRESENCE OF RIGHT ARTIFICIAL HIP JOINT: ICD-10-CM

## 2022-02-08 DIAGNOSIS — N13.5 CROSSING VESSEL AND STRICTURE OF URETER WITHOUT HYDRONEPHROSIS: ICD-10-CM

## 2022-02-08 DIAGNOSIS — M19.90 UNSPECIFIED OSTEOARTHRITIS, UNSPECIFIED SITE: ICD-10-CM

## 2022-02-08 DIAGNOSIS — Z96.653 PRESENCE OF ARTIFICIAL KNEE JOINT, BILATERAL: ICD-10-CM

## 2022-02-08 DIAGNOSIS — E78.00 PURE HYPERCHOLESTEROLEMIA, UNSPECIFIED: ICD-10-CM

## 2022-02-08 DIAGNOSIS — Z79.82 LONG TERM (CURRENT) USE OF ASPIRIN: ICD-10-CM

## 2022-02-08 DIAGNOSIS — I10 ESSENTIAL (PRIMARY) HYPERTENSION: ICD-10-CM

## 2022-02-10 ENCOUNTER — APPOINTMENT (OUTPATIENT)
Dept: UROLOGY | Facility: CLINIC | Age: 87
End: 2022-02-10
Payer: COMMERCIAL

## 2022-02-10 VITALS
HEART RATE: 63 BPM | DIASTOLIC BLOOD PRESSURE: 69 MMHG | OXYGEN SATURATION: 98 % | HEIGHT: 69 IN | SYSTOLIC BLOOD PRESSURE: 127 MMHG

## 2022-02-10 DIAGNOSIS — N13.5 CROSSING VESSEL AND STRICTURE OF URETER W/OUT HYDRONEPHROSIS: ICD-10-CM

## 2022-02-10 PROBLEM — M19.90 UNSPECIFIED OSTEOARTHRITIS, UNSPECIFIED SITE: Chronic | Status: ACTIVE | Noted: 2022-01-24

## 2022-02-10 PROCEDURE — 99213 OFFICE O/P EST LOW 20 MIN: CPT

## 2022-02-10 NOTE — ASSESSMENT
[FreeTextEntry1] : 86 yo M s/p RIGHT ureteral dilation. More successful this time as ureteroscopy was possible this time and not at previous procedure. He is tolerating stent well. Recommend leaving it for 6 weeks. Plan for cysto stent removal in 6 weeks. RTO sooner PRN.

## 2022-02-11 LAB
APPEARANCE: CLEAR
BACTERIA: ABNORMAL
BILIRUBIN URINE: NEGATIVE
BLOOD URINE: ABNORMAL
COLOR: YELLOW
GLUCOSE QUALITATIVE U: NEGATIVE
HYALINE CASTS: 0 /LPF
KETONES URINE: NEGATIVE
LEUKOCYTE ESTERASE URINE: ABNORMAL
MICROSCOPIC-UA: NORMAL
NITRITE URINE: NEGATIVE
PH URINE: 6
PROTEIN URINE: ABNORMAL
RED BLOOD CELLS URINE: 69 /HPF
SPECIFIC GRAVITY URINE: 1.02
SQUAMOUS EPITHELIAL CELLS: 4 /HPF
UROBILINOGEN URINE: NORMAL
WHITE BLOOD CELLS URINE: 22 /HPF

## 2022-02-14 LAB — BACTERIA UR CULT: NORMAL

## 2022-02-28 ENCOUNTER — NON-APPOINTMENT (OUTPATIENT)
Age: 87
End: 2022-02-28

## 2022-03-02 ENCOUNTER — APPOINTMENT (OUTPATIENT)
Dept: GASTROENTEROLOGY | Facility: CLINIC | Age: 87
End: 2022-03-02
Payer: COMMERCIAL

## 2022-03-02 VITALS
BODY MASS INDEX: 25.01 KG/M2 | HEART RATE: 68 BPM | WEIGHT: 165 LBS | DIASTOLIC BLOOD PRESSURE: 73 MMHG | HEIGHT: 68 IN | SYSTOLIC BLOOD PRESSURE: 122 MMHG

## 2022-03-02 DIAGNOSIS — K44.9 DIAPHRAGMATIC HERNIA W/OUT OBSTRUCTION OR GANGRENE: ICD-10-CM

## 2022-03-02 DIAGNOSIS — R63.4 ABNORMAL WEIGHT LOSS: ICD-10-CM

## 2022-03-02 PROCEDURE — 99204 OFFICE O/P NEW MOD 45 MIN: CPT

## 2022-03-04 ENCOUNTER — LABORATORY RESULT (OUTPATIENT)
Age: 87
End: 2022-03-04

## 2022-03-07 ENCOUNTER — APPOINTMENT (OUTPATIENT)
Dept: CT IMAGING | Facility: CLINIC | Age: 87
End: 2022-03-07
Payer: COMMERCIAL

## 2022-03-07 ENCOUNTER — RESULT REVIEW (OUTPATIENT)
Age: 87
End: 2022-03-07

## 2022-03-07 ENCOUNTER — OUTPATIENT (OUTPATIENT)
Dept: OUTPATIENT SERVICES | Facility: HOSPITAL | Age: 87
LOS: 1 days | End: 2022-03-07
Payer: MEDICARE

## 2022-03-07 DIAGNOSIS — Z98.890 OTHER SPECIFIED POSTPROCEDURAL STATES: Chronic | ICD-10-CM

## 2022-03-07 DIAGNOSIS — H26.9 UNSPECIFIED CATARACT: Chronic | ICD-10-CM

## 2022-03-07 DIAGNOSIS — R19.00 INTRA-ABDOMINAL AND PELVIC SWELLING, MASS AND LUMP, UNSPECIFIED SITE: ICD-10-CM

## 2022-03-07 DIAGNOSIS — Z96.641 PRESENCE OF RIGHT ARTIFICIAL HIP JOINT: Chronic | ICD-10-CM

## 2022-03-07 DIAGNOSIS — Z96.653 PRESENCE OF ARTIFICIAL KNEE JOINT, BILATERAL: Chronic | ICD-10-CM

## 2022-03-07 PROCEDURE — 74177 CT ABD & PELVIS W/CONTRAST: CPT

## 2022-03-07 PROCEDURE — 74177 CT ABD & PELVIS W/CONTRAST: CPT | Mod: 26

## 2022-03-08 ENCOUNTER — APPOINTMENT (OUTPATIENT)
Dept: GASTROENTEROLOGY | Facility: AMBULATORY MEDICAL SERVICES | Age: 87
End: 2022-03-08
Payer: COMMERCIAL

## 2022-03-08 PROBLEM — K44.9 HIATAL HERNIA: Status: ACTIVE | Noted: 2022-03-08

## 2022-03-08 PROBLEM — R63.4 UNINTENTIONAL WEIGHT LOSS: Status: ACTIVE | Noted: 2022-03-08

## 2022-03-08 PROCEDURE — 43239 EGD BIOPSY SINGLE/MULTIPLE: CPT

## 2022-03-08 RX ORDER — OMEPRAZOLE 40 MG/1
40 CAPSULE, DELAYED RELEASE ORAL
Qty: 90 | Refills: 3 | Status: ACTIVE | COMMUNITY
Start: 2022-03-08 | End: 1900-01-01

## 2022-03-08 NOTE — HISTORY OF PRESENT ILLNESS
[de-identified] : 88yo male with multiple medical issues referred by Sarkis Chapman for weight loss \par \par He has had trouble eating for several months. He has continued to lose weight\par He notes early satiety and some dysphagia\par Some post-prandial discomfort\par \par FOUnd to have large HH incidentally on ct scan

## 2022-03-08 NOTE — PHYSICAL EXAM
[General Appearance - Alert] : alert [General Appearance - In No Acute Distress] : in no acute distress [Auscultation Breath Sounds / Voice Sounds] : lungs were clear to auscultation bilaterally [Heart Rate And Rhythm] : heart rate was normal and rhythm regular [Heart Sounds] : normal S1 and S2 [Heart Sounds Gallop] : no gallops [Murmurs] : no murmurs [Heart Sounds Pericardial Friction Rub] : no pericardial rub [Bowel Sounds] : normal bowel sounds [Abdomen Soft] : soft [Abdomen Tenderness] : non-tender [] : no hepato-splenomegaly [Abdomen Mass (___ Cm)] : no abdominal mass palpated [FreeTextEntry1] : [place] with cane [Oriented To Time, Place, And Person] : oriented to person, place, and time [Impaired Insight] : insight and judgment were intact [Affect] : the affect was normal

## 2022-03-08 NOTE — ASSESSMENT
[FreeTextEntry1] : 88yo male with large HH\par unlelar if this cause of his symptoms\par \par will check egd\par consider ugi  series pending above\par omeprazole\par \par I spoke to Dr Javier Chapman - no contraindications to egd

## 2022-03-14 ENCOUNTER — NON-APPOINTMENT (OUTPATIENT)
Age: 87
End: 2022-03-14

## 2022-03-15 ENCOUNTER — RESULT REVIEW (OUTPATIENT)
Age: 87
End: 2022-03-15

## 2022-03-15 ENCOUNTER — OUTPATIENT (OUTPATIENT)
Dept: OUTPATIENT SERVICES | Facility: HOSPITAL | Age: 87
LOS: 1 days | End: 2022-03-15
Payer: MEDICARE

## 2022-03-15 DIAGNOSIS — H26.9 UNSPECIFIED CATARACT: Chronic | ICD-10-CM

## 2022-03-15 DIAGNOSIS — Z98.890 OTHER SPECIFIED POSTPROCEDURAL STATES: Chronic | ICD-10-CM

## 2022-03-15 DIAGNOSIS — Z96.641 PRESENCE OF RIGHT ARTIFICIAL HIP JOINT: Chronic | ICD-10-CM

## 2022-03-15 DIAGNOSIS — K44.9 DIAPHRAGMATIC HERNIA WITHOUT OBSTRUCTION OR GANGRENE: ICD-10-CM

## 2022-03-15 DIAGNOSIS — Z96.653 PRESENCE OF ARTIFICIAL KNEE JOINT, BILATERAL: Chronic | ICD-10-CM

## 2022-03-15 PROCEDURE — 74240 X-RAY XM UPR GI TRC 1CNTRST: CPT

## 2022-03-15 PROCEDURE — 74240 X-RAY XM UPR GI TRC 1CNTRST: CPT | Mod: 26

## 2022-03-16 ENCOUNTER — NON-APPOINTMENT (OUTPATIENT)
Age: 87
End: 2022-03-16

## 2022-03-16 DIAGNOSIS — K44.9 DIAPHRAGMATIC HERNIA WITHOUT OBSTRUCTION OR GANGRENE: ICD-10-CM

## 2022-03-17 ENCOUNTER — NON-APPOINTMENT (OUTPATIENT)
Age: 87
End: 2022-03-17

## 2022-03-23 ENCOUNTER — APPOINTMENT (OUTPATIENT)
Dept: GASTROENTEROLOGY | Facility: CLINIC | Age: 87
End: 2022-03-23
Payer: MEDICARE

## 2022-03-23 VITALS — BODY MASS INDEX: 23.49 KG/M2 | WEIGHT: 155 LBS | HEIGHT: 68 IN

## 2022-03-23 DIAGNOSIS — R19.00 INTRA-ABDOMINAL AND PELVIC SWELLING, MASS AND LUMP, UNSPECIFIED SITE: ICD-10-CM

## 2022-03-23 PROCEDURE — 99214 OFFICE O/P EST MOD 30 MIN: CPT

## 2022-03-24 ENCOUNTER — APPOINTMENT (OUTPATIENT)
Dept: UROLOGY | Facility: CLINIC | Age: 87
End: 2022-03-24

## 2022-03-25 ENCOUNTER — OUTPATIENT (OUTPATIENT)
Dept: OUTPATIENT SERVICES | Facility: HOSPITAL | Age: 87
LOS: 1 days | End: 2022-03-25
Payer: MEDICARE

## 2022-03-25 VITALS
HEART RATE: 69 BPM | WEIGHT: 160.06 LBS | HEIGHT: 66 IN | DIASTOLIC BLOOD PRESSURE: 64 MMHG | OXYGEN SATURATION: 99 % | SYSTOLIC BLOOD PRESSURE: 124 MMHG | RESPIRATION RATE: 16 BRPM | TEMPERATURE: 98 F

## 2022-03-25 DIAGNOSIS — Z98.890 OTHER SPECIFIED POSTPROCEDURAL STATES: Chronic | ICD-10-CM

## 2022-03-25 DIAGNOSIS — Z96.641 PRESENCE OF RIGHT ARTIFICIAL HIP JOINT: Chronic | ICD-10-CM

## 2022-03-25 DIAGNOSIS — H26.9 UNSPECIFIED CATARACT: Chronic | ICD-10-CM

## 2022-03-25 DIAGNOSIS — Z96.0 PRESENCE OF UROGENITAL IMPLANTS: Chronic | ICD-10-CM

## 2022-03-25 DIAGNOSIS — Z01.818 ENCOUNTER FOR OTHER PREPROCEDURAL EXAMINATION: ICD-10-CM

## 2022-03-25 DIAGNOSIS — R93.3 ABNORMAL FINDINGS ON DIAGNOSTIC IMAGING OF OTHER PARTS OF DIGESTIVE TRACT: ICD-10-CM

## 2022-03-25 DIAGNOSIS — Z96.653 PRESENCE OF ARTIFICIAL KNEE JOINT, BILATERAL: Chronic | ICD-10-CM

## 2022-03-25 PROBLEM — R19.00 RETROPERITONEAL MASS: Status: ACTIVE | Noted: 2022-02-25

## 2022-03-25 LAB
ANION GAP SERPL CALC-SCNC: 7 MMOL/L — SIGNIFICANT CHANGE UP (ref 5–17)
APTT BLD: 30.6 SEC — SIGNIFICANT CHANGE UP (ref 27.5–35.5)
BASOPHILS # BLD AUTO: 0.05 K/UL — SIGNIFICANT CHANGE UP (ref 0–0.2)
BASOPHILS NFR BLD AUTO: 0.5 % — SIGNIFICANT CHANGE UP (ref 0–2)
BUN SERPL-MCNC: 30 MG/DL — HIGH (ref 7–23)
CALCIUM SERPL-MCNC: 9 MG/DL — SIGNIFICANT CHANGE UP (ref 8.5–10.1)
CHLORIDE SERPL-SCNC: 108 MMOL/L — SIGNIFICANT CHANGE UP (ref 96–108)
CO2 SERPL-SCNC: 24 MMOL/L — SIGNIFICANT CHANGE UP (ref 22–31)
CREAT SERPL-MCNC: 1.88 MG/DL — HIGH (ref 0.5–1.3)
EGFR: 34 ML/MIN/1.73M2 — LOW
EOSINOPHIL # BLD AUTO: 0.15 K/UL — SIGNIFICANT CHANGE UP (ref 0–0.5)
EOSINOPHIL NFR BLD AUTO: 1.6 % — SIGNIFICANT CHANGE UP (ref 0–6)
GLUCOSE SERPL-MCNC: 121 MG/DL — HIGH (ref 70–99)
HCT VFR BLD CALC: 38.4 % — LOW (ref 39–50)
HGB BLD-MCNC: 12.8 G/DL — LOW (ref 13–17)
IMM GRANULOCYTES NFR BLD AUTO: 0.7 % — SIGNIFICANT CHANGE UP (ref 0–1.5)
INR BLD: 1.18 RATIO — HIGH (ref 0.88–1.16)
LYMPHOCYTES # BLD AUTO: 0.71 K/UL — LOW (ref 1–3.3)
LYMPHOCYTES # BLD AUTO: 7.6 % — LOW (ref 13–44)
MCHC RBC-ENTMCNC: 29.8 PG — SIGNIFICANT CHANGE UP (ref 27–34)
MCHC RBC-ENTMCNC: 33.3 GM/DL — SIGNIFICANT CHANGE UP (ref 32–36)
MCV RBC AUTO: 89.3 FL — SIGNIFICANT CHANGE UP (ref 80–100)
MONOCYTES # BLD AUTO: 0.66 K/UL — SIGNIFICANT CHANGE UP (ref 0–0.9)
MONOCYTES NFR BLD AUTO: 7.1 % — SIGNIFICANT CHANGE UP (ref 2–14)
NEUTROPHILS # BLD AUTO: 7.7 K/UL — HIGH (ref 1.8–7.4)
NEUTROPHILS NFR BLD AUTO: 82.5 % — HIGH (ref 43–77)
PLATELET # BLD AUTO: 314 K/UL — SIGNIFICANT CHANGE UP (ref 150–400)
POTASSIUM SERPL-MCNC: 3.7 MMOL/L — SIGNIFICANT CHANGE UP (ref 3.5–5.3)
POTASSIUM SERPL-SCNC: 3.7 MMOL/L — SIGNIFICANT CHANGE UP (ref 3.5–5.3)
PROTHROM AB SERPL-ACNC: 13.7 SEC — HIGH (ref 10.5–13.4)
RBC # BLD: 4.3 M/UL — SIGNIFICANT CHANGE UP (ref 4.2–5.8)
RBC # FLD: 18.7 % — HIGH (ref 10.3–14.5)
SODIUM SERPL-SCNC: 139 MMOL/L — SIGNIFICANT CHANGE UP (ref 135–145)
WBC # BLD: 9.34 K/UL — SIGNIFICANT CHANGE UP (ref 3.8–10.5)
WBC # FLD AUTO: 9.34 K/UL — SIGNIFICANT CHANGE UP (ref 3.8–10.5)

## 2022-03-25 PROCEDURE — G0463: CPT | Mod: 25

## 2022-03-25 PROCEDURE — 85025 COMPLETE CBC W/AUTO DIFF WBC: CPT

## 2022-03-25 PROCEDURE — 36415 COLL VENOUS BLD VENIPUNCTURE: CPT

## 2022-03-25 PROCEDURE — 80048 BASIC METABOLIC PNL TOTAL CA: CPT

## 2022-03-25 PROCEDURE — 85610 PROTHROMBIN TIME: CPT

## 2022-03-25 PROCEDURE — 85730 THROMBOPLASTIN TIME PARTIAL: CPT

## 2022-03-25 RX ORDER — ACETAMINOPHEN 500 MG
1 TABLET ORAL
Qty: 0 | Refills: 0 | DISCHARGE

## 2022-03-25 NOTE — H&P PST ADULT - CONSTITUTIONAL
"Anesthesia Release from PACU Note    Patient: Ayaz Cornejo    Procedure(s) Performed: Procedure(s) (LRB):  ESOPHAGOGASTRODUODENOSCOPY (EGD) (N/A)    Anesthesia type: MAC    Post pain: Adequate analgesia    Post assessment: no apparent anesthetic complications, tolerated procedure well and no evidence of recall    Last Vitals:   Visit Vitals  BP (!) 140/89 (BP Location: Left arm, Patient Position: Lying)   Pulse 68   Temp 36.7 °C (98.1 °F)   Resp 17   Ht 5' 8" (1.727 m)   Wt 78.7 kg (173 lb 8 oz)   SpO2 97%   BMI 26.38 kg/m²       Post vital signs: stable    Level of consciousness: responds to stimulation    Nausea/Vomiting: no nausea/no vomiting    Complications: none    Airway Patency: patent    Respiratory: unassisted    Cardiovascular: stable and blood pressure at baseline    Hydration: euvolemic  " detailed exam

## 2022-03-25 NOTE — HISTORY OF PRESENT ILLNESS
[de-identified] : 87 year old man with ureteral stricture with hydronephrosis, large hiatal hernia, with large R retroperitoneal mass. \par \par Patient states he has lost a signiciant amount of weight over the last few months, over 50 pounds. He has no appetite. Can barely eat as he is immediately full after eating a few bites. No vomiting but nausea. No overt signs of GI bleeding like hematemesis, hematochezia, melena. No abdominla pain but constant feeling of bloat and nausea. On work up he was found to have large R retroperitoneal mass causing compression of his duodenum.

## 2022-03-25 NOTE — ASU PATIENT PROFILE, ADULT - FALL HARM RISK - HARM RISK INTERVENTIONS

## 2022-03-25 NOTE — H&P PST ADULT - ASSESSMENT
87 y.o male scheduled for EUS with Dr. Magallon.  Plan  1. Stop all NSAIDS, herbal supplements and vitamins for 7 days.  2. NPO as per Endo  3. Take the following medications Norvasc with small sips of water on the morning of your procedure/surgery.  4. EKG on chart from 1/ 2022  5. CBC, BMP, PT/ INR and PTT done in PST  6. Covid swab done 3/35/2022

## 2022-03-25 NOTE — H&P PST ADULT - NSICDXPASTMEDICALHX_GEN_ALL_CORE_FT
PAST MEDICAL HISTORY:  Abnormal finding on GI tract imaging     Arthritis     COVID-19 vaccine series completed moderna- march 2021    Frequent UTI     Hiatal hernia     Hypercholesteremia     Hypertension     Retroperitoneal mass Right    Ureteral stricture, right

## 2022-03-25 NOTE — REVIEW OF SYSTEMS
[Feeling Poorly] : feeling poorly [Recent Weight Loss (___ Lbs)] : recent [unfilled] ~Ulb weight loss [As Noted in HPI] : as noted in HPI [Negative] : Heme/Lymph

## 2022-03-25 NOTE — PHYSICAL EXAM
[FreeTextEntry1] : elderly, malnourished [Sclera] : the sclera and conjunctiva were normal [PERRL With Normal Accommodation] : pupils were equal in size, round, and reactive to light [Outer Ear] : the ears and nose were normal in appearance [Extraocular Movements] : extraocular movements were intact [Oropharynx] : the oropharynx was normal [Abdomen Soft] : soft [Nail Clubbing] : no clubbing  or cyanosis of the fingernails [Musculoskeletal - Swelling] : no joint swelling seen [Involuntary Movements] : no involuntary movements were seen [Skin Color & Pigmentation] : normal skin color and pigmentation [Skin Turgor] : normal skin turgor [] : no rash [Motor Exam] : the motor exam was normal [No Focal Deficits] : no focal deficits [Oriented To Time, Place, And Person] : oriented to person, place, and time [Impaired Insight] : insight and judgment were intact [Affect] : the affect was normal

## 2022-03-25 NOTE — H&P PST ADULT - NSICDXPASTSURGICALHX_GEN_ALL_CORE_FT
PAST SURGICAL HISTORY:  Bilateral cataracts     History of bilateral knee replacement 2006, 2008    History of cystoscopy Right Ureteroscopy, stent insertion ---9/2021    History of strabismus surgery Bilateral as a child    History of transurethral prostatectomy 2010    S/P hip replacement, right 2010    S/P ureteral stent placement and exchange 1/ 2022

## 2022-03-25 NOTE — ASSESSMENT
[FreeTextEntry1] : 87 year old man with ureteral stricture with hydronephrosis, large hiatal hernia, with large R retroperitoneal mass. \par \par Reviewed CT scan and UGIS extensively. \par \par Does have a significant hernia with most of his stomach intrathoracic (which also will make the EUS very difficult) but I think his symptoms are from extrinsic compression of the mass. Will plan on EUS guided fine needle biopsy. \par \par Then need to decide GJ tube vs stent. May want to wait on the biopsy first prior to doing any of this (ie. if adeno then would place stent, if lymphoma would place GJ tube). Will discuss again with family and will make an intraprocedural decision. \par \par Expediting case and performing in less than a week.

## 2022-03-25 NOTE — H&P PST ADULT - HISTORY OF PRESENT ILLNESS
87 years old male with abnormal GI imaging. Hiatal hernia .large riht retroperitoneal mass observed. CT scans and GI Series done. Indication of displacement of stomach. Early satiety.  Reports no appetite and unintentional weight loss. Patient reports "more than 50 pounds weight loss since January", 2022.  Complain of weakness. "my thighs are so weak." Reports fall this morning. Denies nausea, vomiting or GI Bleed. Planned EUS.

## 2022-03-25 NOTE — H&P PST ADULT - RESPIRATORY AND THORAX
Term female infant born at 39 weeks via .  mother. Apgars were 9 and 9 at 1 and 5 minutes respectively. Infant was AGA. Hepatitis B vaccine was given/declined. Passed hearing B/L. TCB at 24hrs was___, ___risk. Prenatal labs were negative. Maternal blood type O+ Baby's blood type O+ brain negative. Congenital heart disease screening was passed. [facility]  Screening #[id number]. Infant received routine  care, was feeding well, stable and cleared for discharge with follow up instructions. Follow up is planned with PMD Dr. Valentine in 1-2 days. details… Term female infant born at 39 weeks via .  mother. Apgars were 9 and 9 at 1 and 5 minutes respectively. Infant was AGA. Hepatitis B vaccine was given/declined. Passed hearing B/L.TC bili @24 hours of life was 5.7, LIR. Prenatal labs were negative. Maternal blood type O+ Baby's blood type O+ brain negative. Congenital heart disease screening was passed. [facility] Screening #[id number]. Infant [facility]  Screening #[id number]. Infant pediatrician in 1-3 days

## 2022-03-26 DIAGNOSIS — R93.3 ABNORMAL FINDINGS ON DIAGNOSTIC IMAGING OF OTHER PARTS OF DIGESTIVE TRACT: ICD-10-CM

## 2022-03-26 DIAGNOSIS — Z01.818 ENCOUNTER FOR OTHER PREPROCEDURAL EXAMINATION: ICD-10-CM

## 2022-03-28 ENCOUNTER — APPOINTMENT (OUTPATIENT)
Dept: GASTROENTEROLOGY | Facility: HOSPITAL | Age: 87
End: 2022-03-28
Payer: MEDICARE

## 2022-03-28 ENCOUNTER — INPATIENT (INPATIENT)
Facility: HOSPITAL | Age: 87
LOS: 7 days | Discharge: HOSPICE | DRG: 380 | End: 2022-04-05
Attending: INTERNAL MEDICINE | Admitting: FAMILY MEDICINE
Payer: MEDICARE

## 2022-03-28 VITALS
HEART RATE: 81 BPM | SYSTOLIC BLOOD PRESSURE: 155 MMHG | WEIGHT: 160.06 LBS | TEMPERATURE: 97 F | DIASTOLIC BLOOD PRESSURE: 79 MMHG | RESPIRATION RATE: 16 BRPM | OXYGEN SATURATION: 100 % | HEIGHT: 66 IN

## 2022-03-28 DIAGNOSIS — Z98.890 OTHER SPECIFIED POSTPROCEDURAL STATES: Chronic | ICD-10-CM

## 2022-03-28 DIAGNOSIS — R93.3 ABNORMAL FINDINGS ON DIAGNOSTIC IMAGING OF OTHER PARTS OF DIGESTIVE TRACT: ICD-10-CM

## 2022-03-28 DIAGNOSIS — Z96.0 PRESENCE OF UROGENITAL IMPLANTS: Chronic | ICD-10-CM

## 2022-03-28 DIAGNOSIS — Z96.641 PRESENCE OF RIGHT ARTIFICIAL HIP JOINT: Chronic | ICD-10-CM

## 2022-03-28 DIAGNOSIS — Z96.653 PRESENCE OF ARTIFICIAL KNEE JOINT, BILATERAL: Chronic | ICD-10-CM

## 2022-03-28 DIAGNOSIS — H26.9 UNSPECIFIED CATARACT: Chronic | ICD-10-CM

## 2022-03-28 PROCEDURE — 43241 EGD TUBE/CATH INSERTION: CPT

## 2022-03-28 PROCEDURE — 80053 COMPREHEN METABOLIC PANEL: CPT

## 2022-03-28 PROCEDURE — C9399: CPT

## 2022-03-28 PROCEDURE — C1889: CPT

## 2022-03-28 PROCEDURE — 84155 ASSAY OF PROTEIN SERUM: CPT

## 2022-03-28 PROCEDURE — 36415 COLL VENOUS BLD VENIPUNCTURE: CPT

## 2022-03-28 PROCEDURE — 85025 COMPLETE CBC W/AUTO DIFF WBC: CPT

## 2022-03-28 PROCEDURE — 84478 ASSAY OF TRIGLYCERIDES: CPT

## 2022-03-28 PROCEDURE — C1876: CPT

## 2022-03-28 PROCEDURE — 84100 ASSAY OF PHOSPHORUS: CPT

## 2022-03-28 PROCEDURE — U0003: CPT

## 2022-03-28 PROCEDURE — 80048 BASIC METABOLIC PNL TOTAL CA: CPT

## 2022-03-28 PROCEDURE — 97116 GAIT TRAINING THERAPY: CPT | Mod: GP

## 2022-03-28 PROCEDURE — 84134 ASSAY OF PREALBUMIN: CPT

## 2022-03-28 PROCEDURE — 99223 1ST HOSP IP/OBS HIGH 75: CPT

## 2022-03-28 PROCEDURE — 85730 THROMBOPLASTIN TIME PARTIAL: CPT

## 2022-03-28 PROCEDURE — 85610 PROTHROMBIN TIME: CPT

## 2022-03-28 PROCEDURE — 80076 HEPATIC FUNCTION PANEL: CPT

## 2022-03-28 PROCEDURE — 97530 THERAPEUTIC ACTIVITIES: CPT | Mod: GP

## 2022-03-28 PROCEDURE — U0005: CPT

## 2022-03-28 PROCEDURE — 82962 GLUCOSE BLOOD TEST: CPT

## 2022-03-28 PROCEDURE — 76000 FLUOROSCOPY <1 HR PHYS/QHP: CPT

## 2022-03-28 PROCEDURE — 83735 ASSAY OF MAGNESIUM: CPT

## 2022-03-28 PROCEDURE — 97162 PT EVAL MOD COMPLEX 30 MIN: CPT | Mod: GP

## 2022-03-28 PROCEDURE — C9113: CPT

## 2022-03-28 PROCEDURE — 85027 COMPLETE CBC AUTOMATED: CPT

## 2022-03-28 PROCEDURE — C1769: CPT

## 2022-03-28 PROCEDURE — 43259 EGD US EXAM DUODENUM/JEJUNUM: CPT

## 2022-03-28 PROCEDURE — 82040 ASSAY OF SERUM ALBUMIN: CPT

## 2022-03-28 RX ORDER — FENTANYL CITRATE 50 UG/ML
50 INJECTION INTRAVENOUS
Refills: 0 | Status: DISCONTINUED | OUTPATIENT
Start: 2022-03-28 | End: 2022-03-28

## 2022-03-28 RX ORDER — SODIUM CHLORIDE 9 MG/ML
1000 INJECTION, SOLUTION INTRAVENOUS
Refills: 0 | Status: DISCONTINUED | OUTPATIENT
Start: 2022-03-28 | End: 2022-03-28

## 2022-03-28 RX ORDER — PANTOPRAZOLE SODIUM 20 MG/1
40 TABLET, DELAYED RELEASE ORAL
Refills: 0 | Status: DISCONTINUED | OUTPATIENT
Start: 2022-03-28 | End: 2022-04-05

## 2022-03-28 RX ORDER — MORPHINE SULFATE 50 MG/1
2 CAPSULE, EXTENDED RELEASE ORAL
Refills: 0 | Status: DISCONTINUED | OUTPATIENT
Start: 2022-03-28 | End: 2022-04-04

## 2022-03-28 RX ORDER — MORPHINE SULFATE 50 MG/1
2 CAPSULE, EXTENDED RELEASE ORAL EVERY 4 HOURS
Refills: 0 | Status: DISCONTINUED | OUTPATIENT
Start: 2022-03-28 | End: 2022-04-04

## 2022-03-28 RX ORDER — ACETAMINOPHEN 500 MG
650 TABLET ORAL EVERY 4 HOURS
Refills: 0 | Status: DISCONTINUED | OUTPATIENT
Start: 2022-03-28 | End: 2022-04-05

## 2022-03-28 RX ORDER — TIMOLOL 0.5 %
1 DROPS OPHTHALMIC (EYE)
Refills: 0 | Status: DISCONTINUED | OUTPATIENT
Start: 2022-03-28 | End: 2022-04-05

## 2022-03-28 RX ORDER — ONDANSETRON 8 MG/1
4 TABLET, FILM COATED ORAL ONCE
Refills: 0 | Status: DISCONTINUED | OUTPATIENT
Start: 2022-03-28 | End: 2022-03-28

## 2022-03-28 RX ORDER — SODIUM CHLORIDE 9 MG/ML
1000 INJECTION, SOLUTION INTRAVENOUS
Refills: 0 | Status: DISCONTINUED | OUTPATIENT
Start: 2022-03-28 | End: 2022-03-30

## 2022-03-28 RX ORDER — OXYCODONE HYDROCHLORIDE 5 MG/1
5 TABLET ORAL ONCE
Refills: 0 | Status: DISCONTINUED | OUTPATIENT
Start: 2022-03-28 | End: 2022-03-28

## 2022-03-28 RX ORDER — ZOLPIDEM TARTRATE 10 MG/1
1 TABLET ORAL
Qty: 0 | Refills: 0 | DISCHARGE

## 2022-03-28 RX ORDER — HYDRALAZINE HCL 50 MG
10 TABLET ORAL EVERY 8 HOURS
Refills: 0 | Status: DISCONTINUED | OUTPATIENT
Start: 2022-03-28 | End: 2022-03-30

## 2022-03-28 RX ORDER — LIDOCAINE 4 G/100G
5 CREAM TOPICAL EVERY 6 HOURS
Refills: 0 | Status: DISCONTINUED | OUTPATIENT
Start: 2022-03-28 | End: 2022-04-05

## 2022-03-28 RX ORDER — INFLUENZA VIRUS VACCINE 15; 15; 15; 15 UG/.5ML; UG/.5ML; UG/.5ML; UG/.5ML
0.7 SUSPENSION INTRAMUSCULAR ONCE
Refills: 0 | Status: COMPLETED | OUTPATIENT
Start: 2022-03-28 | End: 2022-03-28

## 2022-03-28 RX ADMIN — SODIUM CHLORIDE 80 MILLILITER(S): 9 INJECTION, SOLUTION INTRAVENOUS at 18:48

## 2022-03-28 RX ADMIN — PANTOPRAZOLE SODIUM 40 MILLIGRAM(S): 20 TABLET, DELAYED RELEASE ORAL at 16:00

## 2022-03-28 RX ADMIN — Medication 10 MILLIGRAM(S): at 15:50

## 2022-03-28 NOTE — H&P ADULT - ASSESSMENT
1. Large RP mass obstructing stomach with decreased po intake and severe protein calorie malnutrition with 30 lbs weight loss and right kidney/ureter obstruction   - NPO   - IR biopsy in am   - NGT now to LWS   - IV PPI   - pain control   - possible stent vs surgery   - nutrition eval for PPN    2. HTN - hydralazine PRN    3. VTE proph - SCDs    Discussed with pt, Dr. Magallon and GI NP Roseline

## 2022-03-28 NOTE — CONSULT NOTE ADULT - CONSULT REASON
88yo M with retroperitoneal mass causing GI obstruction, EUS could not biopsy due to food impaction, referred to IR for percutaneous FNA

## 2022-03-28 NOTE — PATIENT PROFILE ADULT - FALL HARM RISK - HARM RISK INTERVENTIONS

## 2022-03-28 NOTE — BRIEF OPERATIVE NOTE - COMMENTS
- Admit to hospital for failure to thrive, duodenal obstruction.    - Nutrition evaluation for PPN, TPN.    - NG tube to low intermittent suction.    - IR guided biopsy.    - Depending on biopsy, will then decide GJ tube vs stent vs venting PEG vs surgical gastrojejunostomy.

## 2022-03-28 NOTE — H&P ADULT - NSHPPHYSICALEXAM_GEN_ALL_CORE
PHYSICAL EXAM:    General: elderly cachectic male in mild acute distress with NGT  Eyes: PERRLA, EOMI; conjunctiva and sclera clear  Head: Normocephalic; atraumatic  ENMT: No nasal discharge; airway clear  Neck: Supple; non tender; no masses  Respiratory: coarse BS BL  Cardiovascular: S1, S2 reg  Gastrointestinal: Soft non-tender mildly distended abd with + bowel sounds  Genitourinary: No costovertebral angle tenderness  Extremities:  No clubbing, cyanosis, + 1 BL LE edema  Vascular: Peripheral pulses palpable 2+ bilaterally  Neurological: Alert and oriented x4  Skin: Warm and dry.   Musculoskeletal: Normal tone  Psychiatric: Cooperative and appropriate

## 2022-03-28 NOTE — CONSULT NOTE ADULT - SUBJECTIVE AND OBJECTIVE BOX
History of Present Illness:   87 y.o. male with large obstructive retroperitoneal mass s/p unsuccessful EUS attempt due to near complete stomach obstruction with decreased po intake and 30lb weight loss now admitted for IR bx in am and possible stent placement      Allergies:  	No Known Drug Allergies:       Home Medications:   * Patient Currently Takes Medications as of 28-Mar-2022 11:08 documented in Structured Notes  · 	Zocor 40 mg oral tablet: Last Dose Taken: 27-Mar-2022 PM, 1 tab(s) orally once a day (at bedtime)  · 	Norvasc 10 mg oral tablet: Last Dose Taken: 27-Mar-2022 8:00 AM, 1 tab(s) orally once a day   · 	Ambien 10 mg oral tablet: Last Dose Taken: 27-Mar-2022 10:00 PM, 1 tab(s) orally once a day (at bedtime), As Needed  · 	Timolol Maleate, Ophthalmic 0.5% preservative-free ophthalmic solution: Last Dose Taken: 27-Mar-2022 PM, 1 drop(s) to each affected eye 2 times a day--patient did not bring medication  · 	aspirin 81 mg oral delayed release tablet: Last Dose Taken: 26-Mar-2022 , 1 tab(s) orally once a day  · 	Multiple Vitamins oral tablet: Last Dose Taken: 27-Mar-2022 8:00 AM, 1 tab(s) orally once a day  · 	Tylenol 325 mg oral tablet: Last Dose Taken: 27-Mar-2022 10:00 PM, 2 tab(s) orally once a day (at bedtime), As Needed    .    Patient History:    Past Medical, Past Surgical, and Family History:  PAST MEDICAL HISTORY:  Abnormal finding on GI tract imaging     Arthritis     COVID-19 vaccine series completed moderna- 2021    Frequent UTI     Hiatal hernia     Hypercholesteremia     Hypertension     Retroperitoneal mass Right    Ureteral stricture, right.     PAST SURGICAL HISTORY:  Bilateral cataracts     History of bilateral knee replacement ,     History of cystoscopy Right Ureteroscopy, stent insertion ---2021    History of strabismus surgery Bilateral as a child    History of transurethral prostatectomy 2010    S/P hip replacement, right 2010    S/P ureteral stent placement and exchange 2022.     FAMILY HISTORY:  FH: lung cancer, Age 63,     Mother  Still living? Unknown  FH: breast cancer, Age at diagnosis: Age Unknown.

## 2022-03-28 NOTE — H&P ADULT - HISTORY OF PRESENT ILLNESS
87 y.o. male with large obstructive retroperitoneal mass s/p unsuccessful EUS attempt due to near complete stomach obstruction with decreased po intake and 30lb weight loss now admitted for IR bx in am and possible stent placement.

## 2022-03-28 NOTE — CONSULT NOTE ADULT - ASSESSMENT
86yo M with retroperitoneal mass causing GI obstruction, EUS could not biopsy due to food impaction, referred to IR for percutaneous FNA  - Case [facility] Dr. Escobar, [facility]  - Spoke with Dr. Rg, pt will need to be prone and intubated with general anesthesia for procedure due to presence of food in stomach  - Covid negative 3/26 on HIE

## 2022-03-29 LAB
ALBUMIN SERPL ELPH-MCNC: 2.5 G/DL — LOW (ref 3.3–5)
ALBUMIN SERPL ELPH-MCNC: 2.6 G/DL — LOW (ref 3.3–5)
ALP SERPL-CCNC: 1274 U/L — HIGH (ref 40–120)
ALT FLD-CCNC: 177 U/L — HIGH (ref 12–78)
ANION GAP SERPL CALC-SCNC: 7 MMOL/L — SIGNIFICANT CHANGE UP (ref 5–17)
APTT BLD: 31 SEC — SIGNIFICANT CHANGE UP (ref 27.5–35.5)
AST SERPL-CCNC: 155 U/L — HIGH (ref 15–37)
BILIRUB DIRECT SERPL-MCNC: 5.5 MG/DL — HIGH (ref 0–0.3)
BILIRUB INDIRECT FLD-MCNC: 0.9 MG/DL — SIGNIFICANT CHANGE UP (ref 0.2–1)
BILIRUB SERPL-MCNC: 6.4 MG/DL — HIGH (ref 0.2–1.2)
BUN SERPL-MCNC: 30 MG/DL — HIGH (ref 7–23)
CALCIUM SERPL-MCNC: 9 MG/DL — SIGNIFICANT CHANGE UP (ref 8.5–10.1)
CHLORIDE SERPL-SCNC: 110 MMOL/L — HIGH (ref 96–108)
CO2 SERPL-SCNC: 25 MMOL/L — SIGNIFICANT CHANGE UP (ref 22–31)
CREAT SERPL-MCNC: 1.68 MG/DL — HIGH (ref 0.5–1.3)
EGFR: 39 ML/MIN/1.73M2 — LOW
GLUCOSE SERPL-MCNC: 133 MG/DL — HIGH (ref 70–99)
HCT VFR BLD CALC: 36.3 % — LOW (ref 39–50)
HGB BLD-MCNC: 12.3 G/DL — LOW (ref 13–17)
INR BLD: 1.17 RATIO — HIGH (ref 0.88–1.16)
MAGNESIUM SERPL-MCNC: 2.6 MG/DL — SIGNIFICANT CHANGE UP (ref 1.6–2.6)
MCHC RBC-ENTMCNC: 30 PG — SIGNIFICANT CHANGE UP (ref 27–34)
MCHC RBC-ENTMCNC: 33.9 GM/DL — SIGNIFICANT CHANGE UP (ref 32–36)
MCV RBC AUTO: 88.5 FL — SIGNIFICANT CHANGE UP (ref 80–100)
PHOSPHATE SERPL-MCNC: 2.7 MG/DL — SIGNIFICANT CHANGE UP (ref 2.5–4.5)
PLATELET # BLD AUTO: 326 K/UL — SIGNIFICANT CHANGE UP (ref 150–400)
POTASSIUM SERPL-MCNC: 3.7 MMOL/L — SIGNIFICANT CHANGE UP (ref 3.5–5.3)
POTASSIUM SERPL-SCNC: 3.7 MMOL/L — SIGNIFICANT CHANGE UP (ref 3.5–5.3)
PREALB SERPL-MCNC: 15 MG/DL — LOW (ref 20–40)
PROT SERPL-MCNC: 6.2 GM/DL — SIGNIFICANT CHANGE UP (ref 6–8.3)
PROT SERPL-MCNC: 6.4 GM/DL — SIGNIFICANT CHANGE UP (ref 6–8.3)
PROTHROM AB SERPL-ACNC: 13.6 SEC — HIGH (ref 10.5–13.4)
RBC # BLD: 4.1 M/UL — LOW (ref 4.2–5.8)
RBC # FLD: 20 % — HIGH (ref 10.3–14.5)
SODIUM SERPL-SCNC: 142 MMOL/L — SIGNIFICANT CHANGE UP (ref 135–145)
WBC # BLD: 11.35 K/UL — HIGH (ref 3.8–10.5)
WBC # FLD AUTO: 11.35 K/UL — HIGH (ref 3.8–10.5)

## 2022-03-29 PROCEDURE — 99232 SBSQ HOSP IP/OBS MODERATE 35: CPT

## 2022-03-29 PROCEDURE — 99223 1ST HOSP IP/OBS HIGH 75: CPT

## 2022-03-29 PROCEDURE — 99222 1ST HOSP IP/OBS MODERATE 55: CPT

## 2022-03-29 RX ORDER — ELECTROLYTE SOLUTION,INJ
1 VIAL (ML) INTRAVENOUS
Refills: 0 | Status: DISCONTINUED | OUTPATIENT
Start: 2022-03-29 | End: 2022-03-29

## 2022-03-29 RX ORDER — ENOXAPARIN SODIUM 100 MG/ML
40 INJECTION SUBCUTANEOUS EVERY 24 HOURS
Refills: 0 | Status: DISCONTINUED | OUTPATIENT
Start: 2022-03-29 | End: 2022-04-05

## 2022-03-29 RX ADMIN — Medication 1 EACH: at 22:38

## 2022-03-29 RX ADMIN — PANTOPRAZOLE SODIUM 40 MILLIGRAM(S): 20 TABLET, DELAYED RELEASE ORAL at 21:36

## 2022-03-29 RX ADMIN — ENOXAPARIN SODIUM 40 MILLIGRAM(S): 100 INJECTION SUBCUTANEOUS at 14:49

## 2022-03-29 RX ADMIN — Medication 1 DROP(S): at 10:08

## 2022-03-29 RX ADMIN — Medication 10 MILLIGRAM(S): at 16:45

## 2022-03-29 RX ADMIN — Medication 1 DROP(S): at 21:36

## 2022-03-29 RX ADMIN — Medication 10 MILLIGRAM(S): at 05:40

## 2022-03-29 RX ADMIN — PANTOPRAZOLE SODIUM 40 MILLIGRAM(S): 20 TABLET, DELAYED RELEASE ORAL at 10:05

## 2022-03-29 RX ADMIN — SODIUM CHLORIDE 80 MILLILITER(S): 9 INJECTION, SOLUTION INTRAVENOUS at 16:56

## 2022-03-29 NOTE — DIETITIAN INITIAL EVALUATION ADULT. - PERTINENT MEDS FT
MEDICATIONS  (STANDING):  dextrose 5% + sodium chloride 0.9%. 1000 milliLiter(s) (80 mL/Hr) IV Continuous <Continuous>  pantoprazole  Injectable 40 milliGRAM(s) IV Push two times a day  Parenteral Nutrition - Adult 1 Each (83 mL/Hr) TPN Continuous <Continuous>  timolol 0.5% Solution 1 Drop(s) Both EYES two times a day    MEDICATIONS  (PRN):  acetaminophen  Suppository .. 650 milliGRAM(s) Rectal every 4 hours PRN Mild Pain (1 - 3)  hydrALAZINE Injectable 10 milliGRAM(s) IV Push every 8 hours PRN SBP>140  lidocaine 2% Viscous 5 milliLiter(s) Swish and Spit every 6 hours PRN Mouth Care  morphine  - Injectable 2 milliGRAM(s) IV Push every 4 hours PRN Moderate Pain (4 - 6)  morphine  - Injectable 2 milliGRAM(s) IV Push every 2 hours PRN Severe Pain (7 - 10)

## 2022-03-29 NOTE — CONSULT NOTE ADULT - ASSESSMENT
87 y.o. male with large obstructive retroperitoneal mass s/p unsuccessful EUS attempt due to near complete stomach obstruction with decreased po intake and 30lb weight loss now admitted for IR bx in am and possible stent placement. Awaiting IR procedure today.  87 y.o. male with large obstructive retroperitoneal mass s/p unsuccessful EUS attempt due to near complete stomach obstruction with decreased po intake and 30lb weight loss now admitted for IR bx in am and possible stent placement. Labs with transaminitis/bili 5.5/alk phos >1200. Awaiting IR procedure today. Patient extremely nutrient deficient with likely necessity for ?peg/GJ tube.     Plan:  ::Continue NGT low suction  ::For IR today for bx, possible stent  ::Supportive care prn   87 y.o. male with large obstructive retroperitoneal mass s/p unsuccessful EUS attempt due to near complete stomach obstruction with decreased po intake and 30lb weight loss now admitted for IR bx and NG tube decompression. Labs with transaminitis/bili 5.5/alk phos >1200. Awaiting IR procedure today. Patient extremely nutrient deficient with likely necessity for ?peg/GJ tube.     Plan:  ::Continue NGT low suction  ::For IR today for bx, possible stent  ::Supportive care prn

## 2022-03-29 NOTE — DIETITIAN INITIAL EVALUATION ADULT. - PERTINENT LABORATORY DATA
03-29    142  |  110<H>  |  30<H>  ----------------------------<  133<H>  3.7   |  25  |  1.68<H>    Ca    9.0      29 Mar 2022 07:37  Phos  2.7     03-29  Mg     2.6     03-29    TPro  6.4  /  Alb  2.5<L>  /  TBili  6.4<H>  /  DBili  5.5<H>  /  AST  155<H>  /  ALT  177<H>  /  AlkPhos  1274<H>  03-29

## 2022-03-29 NOTE — CHART NOTE - NSCHARTNOTEFT_GEN_A_CORE
Patient scheduled for retroperitoneal mass FNA today with anesthesia    When patient was called for, patient stated he did not want biopsy and he only wanted gastric stent placed. Patient not sure he would want treatment for retroperitoneal mass regardless of what it was. GI team explained to patient he cannot get megha    Discussed with GI team, Dr Magallon. Will defer biopsy at this time. Patient scheduled for retroperitoneal mass FNA today with anesthesia    When patient was called for, patient stated he did not want biopsy and he only wanted gastric stent placed. Patient not sure he would want treatment for retroperitoneal mass regardless of what it was. GI team explained to patient he cannot get gastric stent yet as there is too much food in his stomach and this may not resolve for some time    Discussed with GI team, Dr Magallon. Will defer biopsy at this time. Please reconsult IR should patient require/desire biopsy at a later date

## 2022-03-29 NOTE — CONSULT NOTE ADULT - NS ATTEND AMEND GEN_ALL_CORE FT
87 year old man with duodenal obstruction from retroperitoneal mass.     Had an extensive discussion with patient today, as well as his son/daughter in law over the phone.   At this point, the patient would not want any chemotherapy or aggressive treatments. Even if this is a lymphoma, would not want treatment. Therefore, he didn't want to go for IR guided biopsy.     Since he wants to focus on comfort, nutrition, and ability to eat, then will not pursue aggressive interventions for biopsy. He also doesn't want a GJ tube and would rather have a stent. I involved my surgical colleague Dr. Duke to see if surgical bypass feasbile, but at this point stent is the best option due to the lesion, his nutritional status, and wanting no treatments.     Still needs NG tube decompression for rest of the week.     New issue now is his jaundice, lesion causing biliary obstruction. This is an issue because if the ampulla is distal to the obstruction will be difficult to perform standard ERCP (or through a duodenal stent). Discussed EUS guided choledochoduodenostomy with family and patient, this is an option. Would hate to have a biliary drain in him considering his wanting no pain and palliation, but will have to send if needed.     Plan for EUS (will FNA lesion if safe and easy), duodenal stent, and biliary decompression friday.     Discussed extensively with patient and family.     Please call palliative care.

## 2022-03-29 NOTE — CONSULT NOTE ADULT - SUBJECTIVE AND OBJECTIVE BOX
Pt is an 86 yo M who was admitted to  for duodenal obstruction caused by large retroperitoneal mass. Mass has caused ureteral obstruction. Per pt, states family and friends have noted yellowing of the skin and eyes for the past few days. Pt reports decrease in appetite with an associated 30lb weight loss. Pt reports EUS for stent placement for the obstruction was unsuccessful 2 days ago due to a large amount of retained food. Pt also reports he refused IR retroperitoneal biopsy of the mass today because he does not believe it will change his management and outcome. Per pt, does not want a feeding tube.     PAST MEDICAL & SURGICAL HISTORY:  Hypercholesteremia    Hypertension    COVID-19 vaccine series completed  moderna- march 2021    Ureteral stricture, right    Arthritis    Abnormal finding on GI tract imaging    Frequent UTI    Hiatal hernia    Retroperitoneal mass  Right    S/P hip replacement, right  2010    History of bilateral knee replacement  2006, 2008    History of transurethral prostatectomy  2010    History of cystoscopy  Right Ureteroscopy, stent insertion ---9/2021    History of strabismus surgery  Bilateral as a child    Bilateral cataracts    S/P ureteral stent placement  and exchange 1/ 2022    Vital Signs Last 24 Hrs  T(C): 36.6 (29 Mar 2022 08:16), Max: 37.1 (28 Mar 2022 16:57)  T(F): 97.8 (29 Mar 2022 08:16), Max: 98.7 (28 Mar 2022 16:57)  HR: 77 (29 Mar 2022 10:09) (62 - 77)  BP: 163/68 (29 Mar 2022 10:09) (124/62 - 169/82)  BP(mean): --  RR: 18 (29 Mar 2022 10:09) (14 - 18)  SpO2: 97% (29 Mar 2022 08:16) (96% - 100%)    Physical Exam:  General: AAOx3, cachetic, elderly male  HEENT: NC/AT, EOMI, NGT in place  Cardio: S1S2, RRR  Pulm: equal air entry b/l, non labored  Abdomen: soft, NT/ND    Labs:                        12.3   11.35 )-----------( 326      ( 29 Mar 2022 07:37 )             36.3   03-29    142  |  110<H>  |  30<H>  ----------------------------<  133<H>  3.7   |  25  |  1.68<H>    Ca    9.0      29 Mar 2022 07:37  Phos  2.7     03-29  Mg     2.6     03-29    TPro  6.4  /  Alb  2.5<L>  /  TBili  6.4<H>  /  DBili  5.5<H>  /  AST  155<H>  /  ALT  177<H>  /  AlkPhos  1274<H>  03-29    Imaging:  < from: Upper EUS (03.28.22 @ 13:12) >    IMPRESS Impression:          - Severe duodenal obstruction from retroperitoneal mass     IMPRESS                      with a large amount of food making fine needle biopsy     IMPRESS                      technically impossible.    IMPRESS- Transnasal endoscopy with NG tube left in place.    < end of copied text >  < from: Xray UGI Single Contrast Study (03.15.22 @ 11:11) >    ACC: [id number] EXAM:  XR UGI SNGL CON STDY                          PROCEDURE DATE:  03/15/2022      < end of copied text >  < from: Xray UGI Single Contrast Study (03.15.22 @ 11:11) >  IMPRESSION:    Severely narrowed second portionof the duodenum corresponding to the   area of tumor encasement seen on recent CT scan.    Intrathoracic stomach with nonobstructing gastric volvulus.    < end of copied text >

## 2022-03-29 NOTE — CONSULT NOTE ADULT - ASSESSMENT
86 yo M with large retroperitoneal mass causing duodenal obstruction as well as compression on the portal system and right ureter.    Plan:  -continue with NGT to LWS  -flush NGT q4h to ensure patency  -GI on board, for repeat EUS on Friday for stent placement  -medical management as per primary team  -surgical oncology will follow    Plan discussed with Dr. Dial

## 2022-03-29 NOTE — CHART NOTE - NSCHARTNOTEFT_GEN_A_CORE
Clinical Nutrition PN Follow Up Note  87 y.o. male with large obstructive retroperitoneal mass s/p unsuccessful EUS attempt due to near complete stomach obstruction with decreased po intake and 30lb weight loss now admitted for IR bx in am and possible stent placement.    **See initial assessment for details**    *labs reviewed;  03-29    142  |  110<H>  |  30<H>  ----------------------------<  133<H>  3.7   |  25  |  1.68<H>    Ca    9.0      29 Mar 2022 07:37  Phos  2.7     03-29  Mg     2.6     03-29    TPro  6.4  /  Alb  2.5<L>  /  TBili  6.4<H>  /  DBili  5.5<H>  /  AST  155<H>  /  ALT  177<H>  /  AlkPhos  1274<H>  03-29      BMI: BMI (kg/m2): 25.8 (03-28-22 @ 10:55)  HbA1c:   Glucose:   BP: 163/68 (03-29-22 @ 10:09) (124/62 - 175/76)  Lipid Panel:     MEDICATIONS  (STANDING):  dextrose 5% + sodium chloride 0.9%. 1000 milliLiter(s) (80 mL/Hr) IV Continuous <Continuous>  pantoprazole  Injectable 40 milliGRAM(s) IV Push two times a day  Parenteral Nutrition - Adult 1 Each (83 mL/Hr) TPN Continuous <Continuous>  timolol 0.5% Solution 1 Drop(s) Both EYES two times a day    MEDICATIONS  (PRN):  acetaminophen  Suppository .. 650 milliGRAM(s) Rectal every 4 hours PRN Mild Pain (1 - 3)  hydrALAZINE Injectable 10 milliGRAM(s) IV Push every 8 hours PRN SBP>140  lidocaine 2% Viscous 5 milliLiter(s) Swish and Spit every 6 hours PRN Mouth Care  morphine  - Injectable 2 milliGRAM(s) IV Push every 4 hours PRN Moderate Pain (4 - 6)  morphine  - Injectable 2 milliGRAM(s) IV Push every 2 hours PRN Severe Pain (7 - 10)      *I&O's Detail    28 Mar 2022 07:01  -  29 Mar 2022 07:00  --------------------------------------------------------  IN:    Lactated Ringers: 225 mL    Other (mL): 1000 mL  Total IN: 1225 mL    OUT:    Nasogastric/Oral tube (mL): 25 mL    Voided (mL): 0 mL  Total OUT: 25 mL    Total NET: 1200 mL      * fluid status: BM (+) on .  pt on bowel regimen.    *PO/TF intake:  Poor PO intake for several months with 30lb weight loss     *Boubacar Score of 13; PU documented.  edema documented.    Estimated Needs: based on Kg (wt from )  Calories: Kcal (Kcal/Kg)  Protein:  g (g/Kg)  Fluids:   mL (mL/Kg)      Weight History:  Height (cm): 167.6 (03-28-22 @ 10:55), 167.6 (03-25-22 @ 13:09)  Weight (kg): 72.6 (03-28-22 @ 10:55), 72.6 (03-25-22 @ 13:09)  BMI (kg/m2): 25.8 (03-28-22 @ 10:55), 25.8 (03-25-22 @ 13:09)  BSA (m2): 1.82 (03-28-22 @ 10:55), 1.82 (03-25-22 @ 13:09)  IBW:  IBW %:    *will continue to monitor and adjust PN prn*    PPN Recommendations: via peripheral access    Total Volume: 2000ml  75 g  Amino Acids  100 g Dextrose   g Lipids 20%  113 mEq Sodium Chloride  15 mEq Sodium Acetate  20 mmol Sodium Phosphate  32 mEq Potassium Chloride  18 mEq Potassium Acetate  0 mmol Potassium Phosphate  0 mEq Calcium Gluconate  0 mEq Magnesium Sulfate  100 mg Thiamine  10 ml MVI  60 mcg Selenium  10 mcg Chromium  5 mcg Zinc    Total Calories       640kcal     (   Meets       %  of  Estimated Energy needs  and          %  Protein needs)(osmolarity)    Additional Recommendations:    1) Daily weights  2) Strict I & O's  3) Daily lyte checks  4) Weekly triglycerides/LFT checks

## 2022-03-29 NOTE — DIETITIAN NUTRITION RISK NOTIFICATION - ADDITIONAL COMMENTS/DIETITIAN RECOMMENDATIONS
-Start PN to help meet needs  -If pt's diet cannot be advanced consider TPN to meet 100% of patients nutritional needs  -Monitor labs  -Monitor BMs  -Monitor output

## 2022-03-29 NOTE — DIETITIAN NUTRITION RISK NOTIFICATION - TREATMENT: THE FOLLOWING DIET HAS BEEN RECOMMENDED
Diet, NPO:   With Ice Chips/Sips of Water (03-28-22 @ 14:43) [Active]      Parenteral Nutrition - Adult 1 Each (83 mL/Hr) TPN Continuous <Continuous>, 03-29-22 @ 22:00, 22:00, Stop order after: 1 Days

## 2022-03-29 NOTE — DIETITIAN INITIAL EVALUATION ADULT. - ORAL INTAKE PTA/DIET HISTORY
Pt reports very poor PO intake over several months. Per pt he could not tolerate much solid food and eventually could not tolerate any PO intake. Pt during this time lost about 30lbs (unsure what weight was prior to weight loss likely about 190lbs). Pt's weight loss about 15% of body weight over 3-4 months; clinically sig. Pt look emaciated and shows clear signs of severe muscle and fat wasting. RD recommends starting PPN immediately as patient is severely malnourished. High Risk of Refeeding

## 2022-03-29 NOTE — CONSULT NOTE ADULT - SUBJECTIVE AND OBJECTIVE BOX
Patient is a 87y old  Male who presents with a chief complaint of s/p unsuccessful EUS (28 Mar 2022 15:42)    HPI:  87 y.o. male with large obstructive retroperitoneal mass s/p unsuccessful EUS attempt due to near complete stomach obstruction with decreased po intake and 30lb weight loss now admitted for IR bx in am and possible stent placement. Patient seen at bedside, NGT in place @ low suction. He does report tenderness in bilateral upper quadrants. Denies nausea, vomiting. Patient does endorse, "I hope they can stent it, because I really don't want a feeding tube." Emphasized importance of nutrition etc.    PAST MEDICAL & SURGICAL HISTORY:  Hypercholesteremia    Hypertension    COVID-19 vaccine series completed  moderna- 2021    Ureteral stricture, right    Arthritis    Abnormal finding on GI tract imaging    Frequent UTI    Hiatal hernia    Retroperitoneal mass  Right    S/P hip replacement, right  2010    History of bilateral knee replacement  ,     History of transurethral prostatectomy      History of cystoscopy  Right Ureteroscopy, stent insertion ---2021    History of strabismus surgery  Bilateral as a child    Bilateral cataracts    S/P ureteral stent placement  and exchange 2022    MEDICATIONS  (STANDING):  dextrose 5% + sodium chloride 0.9%. 1000 milliLiter(s) (80 mL/Hr) IV Continuous <Continuous>  pantoprazole  Injectable 40 milliGRAM(s) IV Push two times a day  timolol 0.5% Solution 1 Drop(s) Both EYES two times a day    MEDICATIONS  (PRN):  acetaminophen  Suppository .. 650 milliGRAM(s) Rectal every 4 hours PRN Mild Pain (1 - 3)  hydrALAZINE Injectable 10 milliGRAM(s) IV Push every 8 hours PRN SBP>140  lidocaine 2% Viscous 5 milliLiter(s) Swish and Spit every 6 hours PRN Mouth Care  morphine  - Injectable 2 milliGRAM(s) IV Push every 4 hours PRN Moderate Pain (4 - 6)  morphine  - Injectable 2 milliGRAM(s) IV Push every 2 hours PRN Severe Pain (7 - 10)    Allergies    adhesives (Rash)  No Known Drug Allergies    Intolerances    SOCIAL HISTORY:    FAMILY HISTORY:  FH: breast cancer (Mother)  Age 37,     FH: lung cancer  Age 63,     REVIEW OF SYSTEMS:    CONSTITUTIONAL: No weakness, fevers or chills  EYES/ENT: No visual changes;  No vertigo or throat pain   NECK: No pain or stiffness  RESPIRATORY: No cough, wheezing, hemoptysis; No shortness of breath  CARDIOVASCULAR: No chest pain or palpitations  GASTROINTESTINAL: See HPI  GENITOURINARY: No dysuria, frequency or hematuria  NEUROLOGICAL: No numbness or weakness  SKIN: No itching, burning, rashes, or lesions   PSYCH: Normal mood and affect  All other review of systems is negative unless indicated above.    Vital Signs Last 24 Hrs  T(C): 36.6 (29 Mar 2022 08:16), Max: 37.1 (28 Mar 2022 16:57)  T(F): 97.8 (29 Mar 2022 08:16), Max: 98.7 (28 Mar 2022 16:57)  HR: 76 (29 Mar 2022 08:16) (62 - 81)  BP: 159/76 (29 Mar 2022 08:16) (124/62 - 175/76)  BP(mean): --  RR: 16 (29 Mar 2022 08:16) (14 - 16)  SpO2: 97% (29 Mar 2022 08:16) (96% - 100%)    PHYSICAL EXAM:    Constitutional: No acute distress, cachectic, frail  HEENT: good phonation, not icteric  Neck: supple, no lymphadenopathy  Respiratory: clear to ascultation bilaterally, no wheezing  Cardiovascular: S1 and S2, regular rate and rhythm, no murmurs rubs or gallops  Gastrointestinal: soft, non-tender, non-distended, +bowel sounds, no rebound or guarding, no surgical scars, no drains  Extremities: No peripheral edema, no cyanosis or clubbing  Vascular: 2+ peripheral pulses, no venous stasis  Neurological: A/O x 3, no focal deficits, no asterixis  Psychiatric: Normal mood, normal affect  Skin: No rashes, jaundiced    LABS:                        12.3   11.35 )-----------( 326      ( 29 Mar 2022 07:37 )             36.3     03-    142  |  110<H>  |  30<H>  ----------------------------<  133<H>  3.7   |  25  |  1.68<H>    Ca    9.0      29 Mar 2022 07:37  Phos  2.7       Mg     2.6         TPro  6.4  /  Alb  2.5<L>  /  TBili  6.4<H>  /  DBili  5.5<H>  /  AST  155<H>  /  ALT  177<H>  /  AlkPhos  1274<H>  03    PT/INR - ( 29 Mar 2022 07:37 )   PT: 13.6 sec;   INR: 1.17 ratio         PTT - ( 29 Mar 2022 07:37 )  PTT:31.0 sec  LIVER FUNCTIONS - ( 29 Mar 2022 07:37 )  Alb: 2.5 g/dL / Pro: 6.4 gm/dL / ALK PHOS: 1274 U/L / ALT: 177 U/L / AST: 155 U/L / GGT: x             RADIOLOGY & ADDITIONAL STUDIES:  EUS 3/28  - Severe duodenal obstruction from retroperitoneal mass with a large amount of food making fine needle biopsy technically impossible.     - Transnasal endoscopy with NG tube left in place. Patient is a 87y old  Male who presents with a chief complaint of s/p unsuccessful EUS (28 Mar 2022 15:42)    HPI:  87 y.o. male with large obstructive retroperitoneal mass s/p unsuccessful EUS attempt due to near complete stomach obstruction with decreased po intake and 30lb weight loss now admitted for IR bx, NG tube decompression.     Patient states that he has had much difficulty eating over the past month. He eats a few bites of food and gets nauseated and bloated. He has an appetite but cannot eat due to these symptoms. A few months ago he had no issues eating. He has lost over 30 pounds. Over the past few weeks his symptoms have gotten progressively worse. He can only tolerate some liquids and that's it. He was imaged and was found to have large retroperitoneal mass compressing duodenum. Had EUS possible stent placement vs GJ tube yesterday but procedure unable to be performed due to amount of food in stomach. NGT left in place. Currently he does report bloating in bilateral upper quadrants despite NG tube. Denies nausea, vomiting. Patient does endorse, "I hope they can stent it, because I really don't want a feeding tube." Emphasized importance of nutrition etc.    PAST MEDICAL & SURGICAL HISTORY:  Hypercholesteremia    Hypertension    COVID-19 vaccine series completed  moderna- 2021    Ureteral stricture, right    Arthritis    Abnormal finding on GI tract imaging    Frequent UTI    Hiatal hernia    Retroperitoneal mass  Right    S/P hip replacement, right  2010    History of bilateral knee replacement  ,     History of transurethral prostatectomy  2010    History of cystoscopy  Right Ureteroscopy, stent insertion ---2021    History of strabismus surgery  Bilateral as a child    Bilateral cataracts    S/P ureteral stent placement  and exchange 2022    MEDICATIONS  (STANDING):  dextrose 5% + sodium chloride 0.9%. 1000 milliLiter(s) (80 mL/Hr) IV Continuous <Continuous>  pantoprazole  Injectable 40 milliGRAM(s) IV Push two times a day  timolol 0.5% Solution 1 Drop(s) Both EYES two times a day    MEDICATIONS  (PRN):  acetaminophen  Suppository .. 650 milliGRAM(s) Rectal every 4 hours PRN Mild Pain (1 - 3)  hydrALAZINE Injectable 10 milliGRAM(s) IV Push every 8 hours PRN SBP>140  lidocaine 2% Viscous 5 milliLiter(s) Swish and Spit every 6 hours PRN Mouth Care  morphine  - Injectable 2 milliGRAM(s) IV Push every 4 hours PRN Moderate Pain (4 - 6)  morphine  - Injectable 2 milliGRAM(s) IV Push every 2 hours PRN Severe Pain (7 - 10)    Allergies    adhesives (Rash)  No Known Drug Allergies    Intolerances    SOCIAL HISTORY:  no smoking, drinking or drugs, has an elderly and sick wife    FAMILY HISTORY:  FH: breast cancer (Mother)  Age 37,     FH: lung cancer  Age 63,     REVIEW OF SYSTEMS:    CONSTITUTIONAL: + weakness,no  fevers or chills  EYES/ENT: No visual changes;  No vertigo or throat pain   NECK: No pain or stiffness  RESPIRATORY: No cough, wheezing, hemoptysis; No shortness of breath  CARDIOVASCULAR: No chest pain or palpitations  GASTROINTESTINAL: See HPI  GENITOURINARY: No dysuria, frequency or hematuria  NEUROLOGICAL: No numbness or weakness  SKIN: No itching, burning, rashes, or lesions   PSYCH: Normal mood and affect  All other review of systems is negative unless indicated above.    Vital Signs Last 24 Hrs  T(C): 36.6 (29 Mar 2022 08:16), Max: 37.1 (28 Mar 2022 16:57)  T(F): 97.8 (29 Mar 2022 08:16), Max: 98.7 (28 Mar 2022 16:57)  HR: 76 (29 Mar 2022 08:16) (62 - 81)  BP: 159/76 (29 Mar 2022 08:16) (124/62 - 175/76)  BP(mean): --  RR: 16 (29 Mar 2022 08:16) (14 - 16)  SpO2: 97% (29 Mar 2022 08:16) (96% - 100%)    PHYSICAL EXAM:    Constitutional: No acute distress, cachectic, frail, NG tube in place  HEENT: good phonation, not icteric  Neck: supple, no lymphadenopathy  Respiratory: clear to ascultation bilaterally, no wheezing  Cardiovascular: S1 and S2, regular rate and rhythm, no murmurs rubs or gallops  Gastrointestinal: soft, non-tender, non-distended, +bowel sounds, no rebound or guarding, no surgical scars, no drains  Extremities: No peripheral edema, no cyanosis or clubbing  Vascular: 2+ peripheral pulses, no venous stasis  Neurological: A/O x 3, no focal deficits, no asterixis  Psychiatric: Normal mood, normal affect  Skin: No rashes,+ jaundiced    LABS:                        12.3   11.35 )-----------( 326      ( 29 Mar 2022 07:37 )             36.3         142  |  110<H>  |  30<H>  ----------------------------<  133<H>  3.7   |  25  |  1.68<H>    Ca    9.0      29 Mar 2022 07:37  Phos  2.7       Mg     2.6         TPro  6.4  /  Alb  2.5<L>  /  TBili  6.4<H>  /  DBili  5.5<H>  /  AST  155<H>  /  ALT  177<H>  /  AlkPhos  1274<H>      PT/INR - ( 29 Mar 2022 07:37 )   PT: 13.6 sec;   INR: 1.17 ratio         PTT - ( 29 Mar 2022 07:37 )  PTT:31.0 sec  LIVER FUNCTIONS - ( 29 Mar 2022 07:37 )  Alb: 2.5 g/dL / Pro: 6.4 gm/dL / ALK PHOS: 1274 U/L / ALT: 177 U/L / AST: 155 U/L / GGT: x             RADIOLOGY & ADDITIONAL STUDIES:  EUS 3/28  - Severe duodenal obstruction from retroperitoneal mass with a large amount of food making fine needle biopsy technically impossible.     - Transnasal endoscopy with NG tube left in place.

## 2022-03-29 NOTE — PROGRESS NOTE ADULT - SUBJECTIVE AND OBJECTIVE BOX
C/C: Decreased po intake.     HPI:  87M, pmh of HTN, HLD, right ureteral stricture s/p stent and exchange who was found to have a large Right sided RP mass and hiatal hernia with poor po intake, 30 pound weight loss,   who had undergone an unsuccessful EUS attempt due to near complete obstruction.  He was referred for admission.   Had NGT placed with significant outpt.   Plan is for IR biopsy/possible stent after decompression.     pt seen and examined this am. No abd pain. Wanted to know when he was going for his procedure and what the next step was.   Does not want PEG/J tube if indicated. Leaning against chemo if indicated as well, but would be willing to undergo RT if necessary.     Review of system- All 10 systems reviewed and is as per HPI otherwise negative.     Vital Signs Last 24 Hrs  T(C): 36.6 (29 Mar 2022 08:16), Max: 37.1 (28 Mar 2022 16:57)  T(F): 97.8 (29 Mar 2022 08:16), Max: 98.7 (28 Mar 2022 16:57)  HR: 77 (29 Mar 2022 10:09) (62 - 77)  BP: 163/68 (29 Mar 2022 10:09) (124/62 - 175/76)  RR: 18 (29 Mar 2022 10:09) (14 - 18)  SpO2: 97% (29 Mar 2022 08:16) (96% - 100%)    PHYSICAL EXAM:    GENERAL: Comfortable, no acute distress  HEAD:  Atraumatic, Normocephalic  EYES: EOMI, PERRLA  HEENT: dry  mucous membranes, NGT+  NECK: Supple, No JVD  NERVOUS SYSTEM:  Alert & Oriented X3, Motor Strength 5/5 B/L upper and lower extremities  CHEST/LUNG: Clear to auscultation bilaterally  HEART: Regular rate and rhythm; No murmurs, rubs, or gallops  ABDOMEN: Soft, Nontender, Nondistended; Bowel sounds present  GENITOURINARY- Voiding, no palpable bladder  EXTREMITIES:  No clubbing, cyanosis, or edema  MUSCULOSKELETAL- No muscle tenderness, No joint tenderness  SKIN-no rash    LABS:                        12.3   11.35 )-----------( 326      ( 29 Mar 2022 07:37 )             36.3     03-29    142  |  110<H>  |  30<H>  ----------------------------<  133<H>  3.7   |  25  |  1.68<H>    Ca    9.0      29 Mar 2022 07:37  Phos  2.7     03-29  Mg     2.6     03-29    TPro  6.4  /  Alb  2.5<L>  /  TBili  6.4<H>  /  DBili  5.5<H>  /  AST  155<H>  /  ALT  177<H>  /  AlkPhos  1274<H>  03-29    PT/INR - ( 29 Mar 2022 07:37 )   PT: 13.6 sec;   INR: 1.17 ratio         PTT - ( 29 Mar 2022 07:37 )  PTT:31.0 sec    MEDS  acetaminophen  Suppository .. 650 milliGRAM(s) Rectal every 4 hours PRN  dextrose 5% + sodium chloride 0.9%. 1000 milliLiter(s) IV Continuous <Continuous>  hydrALAZINE Injectable 10 milliGRAM(s) IV Push every 8 hours PRN  lidocaine 2% Viscous 5 milliLiter(s) Swish and Spit every 6 hours PRN  morphine  - Injectable 2 milliGRAM(s) IV Push every 4 hours PRN  morphine  - Injectable 2 milliGRAM(s) IV Push every 2 hours PRN  pantoprazole  Injectable 40 milliGRAM(s) IV Push two times a day  Parenteral Nutrition - Adult 1 Each TPN Continuous <Continuous>  timolol 0.5% Solution 1 Drop(s) Both EYES two times a day    ASSESSMENT AND PLAN:  87m, PMH as above a/w:    1. Duodenal obstruction d/t large RP mass/severe protein calorie malnutrion  -NGT to LWS  -IVF  -Nutrition consulted for PPN  -IR biopsy today.   -d/w Dr. Magallon, possible stent after decompression depending on path report.   -?oncology consult.    2. HTN:  -norvasc on hold  -prn hydralazine.     3. HLD:  -hold statin while npo.     4. Glaucoma:  -continue gtts.    5.  DVT px:  -start hep sq post procedure if ok with IR.        C/C: Decreased po intake.     HPI:  87M, pmh of HTN, HLD, Hiatal hernia,  right ureteral stricture s/p stent and exchange who was found to have a large Right sided RP mass (most recently 4.4X2.8X11cm) and with poor po intake, 30 pound weight loss,   He had undergone an unsuccessful EUS attempt due to near complete obstruction 3/28 and was referred for admission.   Had NGT placed with significant outpt.     pt seen and examined this am. No abd pain. Wanted to know when he was going for his procedure and what the next step was. Does not feel a biopsy would be helpful as it is most likely this mass is malignant.   Does not want PEG/J tube if indicated. Leaning against chemo if indicated as well, but would be willing to undergo RT if necessary.     Review of system- All 10 systems reviewed and is as per HPI otherwise negative.     Vital Signs Last 24 Hrs  T(C): 36.6 (29 Mar 2022 08:16), Max: 37.1 (28 Mar 2022 16:57)  T(F): 97.8 (29 Mar 2022 08:16), Max: 98.7 (28 Mar 2022 16:57)  HR: 77 (29 Mar 2022 10:09) (62 - 77)  BP: 163/68 (29 Mar 2022 10:09) (124/62 - 175/76)  RR: 18 (29 Mar 2022 10:09) (14 - 18)  SpO2: 97% (29 Mar 2022 08:16) (96% - 100%)    PHYSICAL EXAM:    GENERAL: Comfortable, no acute distress  HEAD:  Atraumatic, Normocephalic  EYES: EOMI, PERRLA  HEENT: dry  mucous membranes, NGT+  NECK: Supple, No JVD  NERVOUS SYSTEM:  Alert & Oriented X3, Motor Strength 5/5 B/L upper and lower extremities  CHEST/LUNG: Clear to auscultation bilaterally  HEART: Regular rate and rhythm; No murmurs, rubs, or gallops  ABDOMEN: Soft, Nontender, Nondistended; Bowel sounds present  GENITOURINARY- Voiding, no palpable bladder  EXTREMITIES:  No clubbing, cyanosis, or edema  MUSCULOSKELETAL- No muscle tenderness, No joint tenderness  SKIN-no rash    LABS:                        12.3   11.35 )-----------( 326      ( 29 Mar 2022 07:37 )             36.3     03-29    142  |  110<H>  |  30<H>  ----------------------------<  133<H>  3.7   |  25  |  1.68<H>    Ca    9.0      29 Mar 2022 07:37  Phos  2.7     03-29  Mg     2.6     03-29    TPro  6.4  /  Alb  2.5<L>  /  TBili  6.4<H>  /  DBili  5.5<H>  /  AST  155<H>  /  ALT  177<H>  /  AlkPhos  1274<H>  03-29    PT/INR - ( 29 Mar 2022 07:37 )   PT: 13.6 sec;   INR: 1.17 ratio         PTT - ( 29 Mar 2022 07:37 )  PTT:31.0 sec    MEDS  acetaminophen  Suppository .. 650 milliGRAM(s) Rectal every 4 hours PRN  dextrose 5% + sodium chloride 0.9%. 1000 milliLiter(s) IV Continuous <Continuous>  hydrALAZINE Injectable 10 milliGRAM(s) IV Push every 8 hours PRN  lidocaine 2% Viscous 5 milliLiter(s) Swish and Spit every 6 hours PRN  morphine  - Injectable 2 milliGRAM(s) IV Push every 4 hours PRN  morphine  - Injectable 2 milliGRAM(s) IV Push every 2 hours PRN  pantoprazole  Injectable 40 milliGRAM(s) IV Push two times a day  Parenteral Nutrition - Adult 1 Each TPN Continuous <Continuous>  timolol 0.5% Solution 1 Drop(s) Both EYES two times a day    ASSESSMENT AND PLAN:  87m, PMH as above a/w:    1. Duodenal obstruction d/t large RP mass/severe protein calorie malnutriton  -NGT to LWS  -IVF  -Nutrition consulted for PPN  -d/w GI, pt declined IR biopsy. After adequate gastric decompression, plan is to attempt stent.   -pt does not want PEG/J tube.     2. Elevated LFTs:  -d/t obstruction  -outpt ct showed intrahepatic biliary ductal dilation + CBD dilation to 1.9cm @ level of RP mass.   -?biliary stent    3. Renal failure/CKD III:  -likely partly obstructive  -had right ureteral stent placed prior to admission.   -also has stones left UV junction without hydro.     4. HTN:  -norvasc on hold  -prn hydralazine.     5. HLD:  -hold statin while npo.     6. Glaucoma:  -continue gtts.    7.  DVT px:  -start hep sq   C/C: Decreased po intake.     HPI:  87M, pmh of HTN, HLD, Hiatal hernia,  right ureteral stricture s/p stent and exchange who was found to have a large Right sided RP mass (most recently 4.4X2.8X11cm) and with poor po intake, 30 pound weight loss,   He had undergone an unsuccessful EUS attempt due to near complete obstruction 3/28 and was referred for admission.   Had NGT placed with significant outpt.     pt seen and examined this am. No abd pain. Wanted to know when he was going for his procedure and what the next step was. Does not feel a biopsy would be helpful as it is most likely this mass is malignant.   Does not want PEG/J tube if indicated. Leaning against chemo if indicated as well, but would be willing to undergo RT if necessary.     Review of system- All 10 systems reviewed and is as per HPI otherwise negative.     Vital Signs Last 24 Hrs  T(C): 36.6 (29 Mar 2022 08:16), Max: 37.1 (28 Mar 2022 16:57)  T(F): 97.8 (29 Mar 2022 08:16), Max: 98.7 (28 Mar 2022 16:57)  HR: 77 (29 Mar 2022 10:09) (62 - 77)  BP: 163/68 (29 Mar 2022 10:09) (124/62 - 175/76)  RR: 18 (29 Mar 2022 10:09) (14 - 18)  SpO2: 97% (29 Mar 2022 08:16) (96% - 100%)    PHYSICAL EXAM:    GENERAL: Comfortable, no acute distress, jaundiced, cachectic.   HEAD:  Atraumatic, Normocephalic  EYES: EOMI, PERRLA, +Scleral icterus  HEENT: dry  mucous membranes, NGT+  NECK: Supple, No JVD  NERVOUS SYSTEM:  Alert & Oriented X3, Motor Strength 5/5 B/L upper and lower extremities  CHEST/LUNG: Clear to auscultation bilaterally  HEART: Regular rate and rhythm; No murmurs, rubs, or gallops  ABDOMEN: Soft, Nontender, Nondistended; Bowel sounds present  GENITOURINARY- Voiding, no palpable bladder  EXTREMITIES:  No clubbing, cyanosis, or edema  MUSCULOSKELETAL- No muscle tenderness, No joint tenderness  SKIN-no rash    LABS:                        12.3   11.35 )-----------( 326      ( 29 Mar 2022 07:37 )             36.3     03-29    142  |  110<H>  |  30<H>  ----------------------------<  133<H>  3.7   |  25  |  1.68<H>    Ca    9.0      29 Mar 2022 07:37  Phos  2.7     03-29  Mg     2.6     03-29    TPro  6.4  /  Alb  2.5<L>  /  TBili  6.4<H>  /  DBili  5.5<H>  /  AST  155<H>  /  ALT  177<H>  /  AlkPhos  1274<H>  03-29    PT/INR - ( 29 Mar 2022 07:37 )   PT: 13.6 sec;   INR: 1.17 ratio         PTT - ( 29 Mar 2022 07:37 )  PTT:31.0 sec    MEDS  acetaminophen  Suppository .. 650 milliGRAM(s) Rectal every 4 hours PRN  dextrose 5% + sodium chloride 0.9%. 1000 milliLiter(s) IV Continuous <Continuous>  hydrALAZINE Injectable 10 milliGRAM(s) IV Push every 8 hours PRN  lidocaine 2% Viscous 5 milliLiter(s) Swish and Spit every 6 hours PRN  morphine  - Injectable 2 milliGRAM(s) IV Push every 4 hours PRN  morphine  - Injectable 2 milliGRAM(s) IV Push every 2 hours PRN  pantoprazole  Injectable 40 milliGRAM(s) IV Push two times a day  Parenteral Nutrition - Adult 1 Each TPN Continuous <Continuous>  timolol 0.5% Solution 1 Drop(s) Both EYES two times a day    ASSESSMENT AND PLAN:  87m, PMH as above a/w:    1. Duodenal obstruction d/t large RP mass/severe protein calorie malnutriton  -NGT to LWS  -IVF  -Nutrition consulted for PPN  -d/w GI, pt declined IR biopsy. After adequate gastric decompression, plan is to attempt stent.   -pt does not want PEG/J tube.     2. Elevated LFTs:  -d/t obstruction  -outpt ct showed intrahepatic biliary ductal dilation + CBD dilation to 1.9cm @ level of RP mass.   -?biliary stent    3. Renal failure/CKD III:  -likely partly obstructive  -had right ureteral stent placed prior to admission.   -also has stones left UV junction without hydro.     4. HTN:  -norvasc on hold  -prn hydralazine.     5. HLD:  -hold statin while npo.     6. Glaucoma:  -continue gtts.    7.  DVT px:  -start lovenox

## 2022-03-30 DIAGNOSIS — K31.5 OBSTRUCTION OF DUODENUM: ICD-10-CM

## 2022-03-30 LAB
ALBUMIN SERPL ELPH-MCNC: 2.3 G/DL — LOW (ref 3.3–5)
ALP SERPL-CCNC: 1128 U/L — HIGH (ref 40–120)
ALT FLD-CCNC: 152 U/L — HIGH (ref 12–78)
ANION GAP SERPL CALC-SCNC: 6 MMOL/L — SIGNIFICANT CHANGE UP (ref 5–17)
AST SERPL-CCNC: 121 U/L — HIGH (ref 15–37)
BASOPHILS # BLD AUTO: 0.02 K/UL — SIGNIFICANT CHANGE UP (ref 0–0.2)
BASOPHILS NFR BLD AUTO: 0.2 % — SIGNIFICANT CHANGE UP (ref 0–2)
BILIRUB SERPL-MCNC: 6.5 MG/DL — HIGH (ref 0.2–1.2)
BUN SERPL-MCNC: 30 MG/DL — HIGH (ref 7–23)
CALCIUM SERPL-MCNC: 8.2 MG/DL — LOW (ref 8.5–10.1)
CHLORIDE SERPL-SCNC: 111 MMOL/L — HIGH (ref 96–108)
CO2 SERPL-SCNC: 25 MMOL/L — SIGNIFICANT CHANGE UP (ref 22–31)
CREAT SERPL-MCNC: 1.4 MG/DL — HIGH (ref 0.5–1.3)
EGFR: 49 ML/MIN/1.73M2 — LOW
EOSINOPHIL # BLD AUTO: 0.02 K/UL — SIGNIFICANT CHANGE UP (ref 0–0.5)
EOSINOPHIL NFR BLD AUTO: 0.2 % — SIGNIFICANT CHANGE UP (ref 0–6)
GLUCOSE SERPL-MCNC: 125 MG/DL — HIGH (ref 70–99)
HCT VFR BLD CALC: 36.2 % — LOW (ref 39–50)
HGB BLD-MCNC: 12 G/DL — LOW (ref 13–17)
IMM GRANULOCYTES NFR BLD AUTO: 0.6 % — SIGNIFICANT CHANGE UP (ref 0–1.5)
LYMPHOCYTES # BLD AUTO: 0.71 K/UL — LOW (ref 1–3.3)
LYMPHOCYTES # BLD AUTO: 7 % — LOW (ref 13–44)
MAGNESIUM SERPL-MCNC: 2.3 MG/DL — SIGNIFICANT CHANGE UP (ref 1.6–2.6)
MCHC RBC-ENTMCNC: 30 PG — SIGNIFICANT CHANGE UP (ref 27–34)
MCHC RBC-ENTMCNC: 33.1 GM/DL — SIGNIFICANT CHANGE UP (ref 32–36)
MCV RBC AUTO: 90.5 FL — SIGNIFICANT CHANGE UP (ref 80–100)
MONOCYTES # BLD AUTO: 0.55 K/UL — SIGNIFICANT CHANGE UP (ref 0–0.9)
MONOCYTES NFR BLD AUTO: 5.4 % — SIGNIFICANT CHANGE UP (ref 2–14)
NEUTROPHILS # BLD AUTO: 8.8 K/UL — HIGH (ref 1.8–7.4)
NEUTROPHILS NFR BLD AUTO: 86.6 % — HIGH (ref 43–77)
PHOSPHATE SERPL-MCNC: 2 MG/DL — LOW (ref 2.5–4.5)
PLATELET # BLD AUTO: 326 K/UL — SIGNIFICANT CHANGE UP (ref 150–400)
POTASSIUM SERPL-MCNC: 3.4 MMOL/L — LOW (ref 3.5–5.3)
POTASSIUM SERPL-SCNC: 3.4 MMOL/L — LOW (ref 3.5–5.3)
PROT SERPL-MCNC: 5.8 GM/DL — LOW (ref 6–8.3)
RBC # BLD: 4 M/UL — LOW (ref 4.2–5.8)
RBC # FLD: 20.3 % — HIGH (ref 10.3–14.5)
SODIUM SERPL-SCNC: 142 MMOL/L — SIGNIFICANT CHANGE UP (ref 135–145)
TRIGL SERPL-MCNC: 153 MG/DL — HIGH
WBC # BLD: 10.16 K/UL — SIGNIFICANT CHANGE UP (ref 3.8–10.5)
WBC # FLD AUTO: 10.16 K/UL — SIGNIFICANT CHANGE UP (ref 3.8–10.5)

## 2022-03-30 PROCEDURE — 99497 ADVNCD CARE PLAN 30 MIN: CPT

## 2022-03-30 PROCEDURE — 99222 1ST HOSP IP/OBS MODERATE 55: CPT | Mod: 25

## 2022-03-30 PROCEDURE — 99498 ADVNCD CARE PLAN ADDL 30 MIN: CPT

## 2022-03-30 PROCEDURE — 99232 SBSQ HOSP IP/OBS MODERATE 35: CPT

## 2022-03-30 RX ORDER — HYDRALAZINE HCL 50 MG
10 TABLET ORAL EVERY 6 HOURS
Refills: 0 | Status: DISCONTINUED | OUTPATIENT
Start: 2022-03-30 | End: 2022-04-05

## 2022-03-30 RX ORDER — HYDRALAZINE HCL 50 MG
5 TABLET ORAL ONCE
Refills: 0 | Status: DISCONTINUED | OUTPATIENT
Start: 2022-03-30 | End: 2022-03-30

## 2022-03-30 RX ORDER — ELECTROLYTE SOLUTION,INJ
1 VIAL (ML) INTRAVENOUS
Refills: 0 | Status: DISCONTINUED | OUTPATIENT
Start: 2022-03-30 | End: 2022-03-30

## 2022-03-30 RX ORDER — HYDRALAZINE HCL 50 MG
5 TABLET ORAL ONCE
Refills: 0 | Status: COMPLETED | OUTPATIENT
Start: 2022-03-30 | End: 2022-03-30

## 2022-03-30 RX ADMIN — Medication 10 MILLIGRAM(S): at 12:53

## 2022-03-30 RX ADMIN — ENOXAPARIN SODIUM 40 MILLIGRAM(S): 100 INJECTION SUBCUTANEOUS at 10:55

## 2022-03-30 RX ADMIN — Medication 10 MILLIGRAM(S): at 00:40

## 2022-03-30 RX ADMIN — Medication 1 DROP(S): at 22:29

## 2022-03-30 RX ADMIN — Medication 10 MILLIGRAM(S): at 22:30

## 2022-03-30 RX ADMIN — Medication 1 DROP(S): at 11:00

## 2022-03-30 RX ADMIN — Medication 5 MILLIGRAM(S): at 06:02

## 2022-03-30 RX ADMIN — PANTOPRAZOLE SODIUM 40 MILLIGRAM(S): 20 TABLET, DELAYED RELEASE ORAL at 22:30

## 2022-03-30 RX ADMIN — PANTOPRAZOLE SODIUM 40 MILLIGRAM(S): 20 TABLET, DELAYED RELEASE ORAL at 10:54

## 2022-03-30 RX ADMIN — Medication 1 EACH: at 23:03

## 2022-03-30 NOTE — CHART NOTE - NSCHARTNOTEFT_GEN_A_CORE
Clinical Nutrition PN Follow Up Note  87 y.o. male with large obstructive retroperitoneal mass s/p unsuccessful EUS attempt due to near complete stomach obstruction with decreased po intake and 30lb weight loss now admitted for IR bx in am and possible stent placement.    03/30: Labs reviewed and K and Po4 are low (will add Potassium Phosphate to bag 10 mEq). Pt's Mg corrected will hold from bag, Tbilli is stil elevated (no trace elements), Waiting on triglyceride to add lipids.     *labs reviewed;  03-30    142  |  111<H>  |  30<H>  ----------------------------<  125<H>  3.4<L>   |  25  |  1.40<H>    Ca    8.2<L>      30 Mar 2022 06:11  Phos  2.0     03-30  Mg     2.3     03-30    TPro  5.8<L>  /  Alb  2.3<L>  /  TBili  6.5<H>  /  DBili  x   /  AST  121<H>  /  ALT  152<H>  /  AlkPhos  1128<H>  03-30        BMI: BMI (kg/m2): 25.8 (03-28-22 @ 10:55)  HbA1c:   Glucose:   BP: 163/68 (03-29-22 @ 10:09) (124/62 - 175/76)  Lipid Panel:     MEDICATIONS  (STANDING):  dextrose 5% + sodium chloride 0.9%. 1000 milliLiter(s) (80 mL/Hr) IV Continuous <Continuous>  pantoprazole  Injectable 40 milliGRAM(s) IV Push two times a day  Parenteral Nutrition - Adult 1 Each (83 mL/Hr) TPN Continuous <Continuous>  timolol 0.5% Solution 1 Drop(s) Both EYES two times a day    MEDICATIONS  (PRN):  acetaminophen  Suppository .. 650 milliGRAM(s) Rectal every 4 hours PRN Mild Pain (1 - 3)  hydrALAZINE Injectable 10 milliGRAM(s) IV Push every 8 hours PRN SBP>140  lidocaine 2% Viscous 5 milliLiter(s) Swish and Spit every 6 hours PRN Mouth Care  morphine  - Injectable 2 milliGRAM(s) IV Push every 4 hours PRN Moderate Pain (4 - 6)  morphine  - Injectable 2 milliGRAM(s) IV Push every 2 hours PRN Severe Pain (7 - 10)      I&O's Detail    29 Mar 2022 07:01  -  30 Mar 2022 07:00  --------------------------------------------------------  IN:    dextrose 5% + sodium chloride 0.9%: 800 mL  Total IN: 800 mL    OUT:    Nasogastric/Oral tube (mL): 215 mL    Voided (mL): 650 mL  Total OUT: 865 mL    Total NET: -65 mL      * fluid status: BM (+) on .  pt on bowel regimen.    *PO/TF intake:  Poor PO intake for several months with 30lb weight loss     *Boubacar Score of 13; PU documented.  edema documented.    Estimated Needs: based on Kg (wt from )  Calories: Kcal (Kcal/Kg)  Protein:  g (g/Kg)  Fluids:   mL (mL/Kg)      Weight History:  Height (cm): 167.6 (03-28-22 @ 10:55), 167.6 (03-25-22 @ 13:09)  Weight (kg): 72.6 (03-28-22 @ 10:55), 72.6 (03-25-22 @ 13:09)  BMI (kg/m2): 25.8 (03-28-22 @ 10:55), 25.8 (03-25-22 @ 13:09)  BSA (m2): 1.82 (03-28-22 @ 10:55), 1.82 (03-25-22 @ 13:09)  IBW:  IBW %:    *will continue to monitor and adjust PN prn*    PPN Recommendations: via peripheral access    Total Volume: 2000ml  80 g  Amino Acids  100 g Dextrose   g Lipids 20%  113 mEq Sodium Chloride  15 mEq Sodium Acetate  20 mmol Sodium Phosphate  32 mEq Potassium Chloride  18 mEq Potassium Acetate  10 mmol Potassium Phosphate  0 mEq Calcium Gluconate  0 mEq Magnesium Sulfate  100 mg Thiamine  10 ml MVI  60 mcg Selenium  10 mcg Chromium  5 mcg Zinc    Total Calories       640kcal     (   Meets       %  of  Estimated Energy needs  and          %  Protein needs)(osmolarity)    Additional Recommendations:    1) Daily weights  2) Strict I & O's  3) Daily lyte checks  4) Weekly triglycerides/LFT checks

## 2022-03-30 NOTE — CONSULT NOTE ADULT - ASSESSMENT
Process of Care  --Reviewed dx/treatment problems and alignment with Goals of Care    Physical Aspects of Care  --Pain  patient denies at this time  c/w current managment    --Bowel Regimen  denies constipation  risk for constipation d/t immobility  daily dulcolax    --Dyspnea  No SOB at this time  comfortable and in NAD    --Nausea Vomiting  denies    --Weakness  PT as tolerated     Psychological and Psychiatric Aspects of Care:   --Greif/Bereavment: emotional support provided  --Hx of psychiatric dx: none  -Pastoral Care Available PRN     Social Aspects of Care  -SW involved     Cultural Aspects  -Primary Language: English    Goals of Care:     We discussed Palliative Care team being a supportive team when a patient has ongoing illnesses.  We also discussed that it is not an end of life care service, but can help navigate symptoms and emotional support througout their hospital stay here.    Hospice was explained as well  as an end of life care philosophy.  When a disease cannot be cured, or family/patient decide the treatment burdens out weigh the risk and one choses to change focus of treatment from cure to quality/comfort.       Prognosis: Death can occur at anytime, but if disease continues to progress naturally patient likely has days to weeks.    Ethical and Legal Aspects:   NA        Capacity:  HCP/Surrogate:      Code Status:  MOLST:  Dispo Plan:    Discussed With: Case coordinated with attending and SW and RN     Time Spent: 90 minutes including the care, coordination and counseling of this patient, 50% of which was spent coordinating and counseling.  Process of Care  --Reviewed dx/treatment problems and alignment with Goals of Care    Physical Aspects of Care  --Pain  patient denies at this time  c/w current managment    --Bowel Regimen  dulcolax FL [id number] hold for BM    --Dyspnea  No SOB at this time  comfortable and in NAD    --Nausea Vomiting  denies    --Weakness  PT as tolerated     --Delirium ppx  -Initiate melatonin 5mg qhs, to promote maintenance of circadian rhythm/restful sleep, and for ppx of future susceptibility to delirium upon resolution of presenting condition.  -Avoid deliriogenic agents including BZD, anticholinergics, and sedating agents if possible  -Re-orient frequently, encourage family at bedside as able.   -Early mobilization recommended if feasible.   Psychological and Psychiatric Aspects of Care:   Grief Bereavement emotional support provided  --Hx of psychiatric dx: none  -Pastoral Care Available PRN     Social Aspects of Care  -SW involved     Cultural Aspects  -Primary Language: English    Goals of Care:     We discussed Palliative Care team being a supportive team when a patient has ongoing illnesses.  We also discussed that it is not an end of life care service, but can help navigate symptoms and emotional support througout their hospital stay here.    Hospice was explained as well  as an end of life care philosophy.  When a disease cannot be cured, or family/patient decide the treatment burdens out weigh the risk and one choses to change focus of treatment from cure to quality/comfort.     Please refer to [facility] for further details    Prognosis: Death can occur at anytime, but if disease continues to progress naturally patient likely has days to weeks.    Ethical and Legal Aspects:   NA        Capacity: Full capacity for complex decision making  HCP/Surrogate: Anahy (daughter) and Trell (son)  Code Status: DNR DNI NO FEEDING TUBE  MOLST: ON CHART  Dispo Plan: DC planning pending stenting likely hospice    Discussed With: Case coordinated with attending and SW and RN     Time Spent: 90 minutes including the care, coordination and counseling of this patient, 50% of which was spent coordinating and counseling.

## 2022-03-30 NOTE — CONSULT NOTE ADULT - SUBJECTIVE AND OBJECTIVE BOX
HPI:      87M, pmh of HTN, HLD, Hiatal hernia,  right ureteral stricture s/p stent and exchange who was found to have a large Right sided RP mass (most recently 4.4X2.8X11cm) and with poor po intake, 30 pound weight loss,      He had undergone an unsuccessful EUS attempt due to near complete obstruction 3/28 and was referred for admission.   Had NGT placed with significant outpt.     3/30/22          PAIN: ( )Yes   ( )No  Level:  Location:  Intensity:    /10  Quality:  Aggravating Factors:  Alleviating Factors:  Radiation:  Duration/Timing:  Impact on ADLs:    DYSPNEA: ( ) Yes  ( ) No  Level:    PAST MEDICAL & SURGICAL HISTORY:  Hypercholesteremia    Hypertension    COVID-19 vaccine series completed  moderna- 2021    Ureteral stricture, right    Arthritis    Abnormal finding on GI tract imaging    Frequent UTI    Hiatal hernia    Retroperitoneal mass  Right    S/P hip replacement, right      History of bilateral knee replacement  ,     History of transurethral prostatectomy      History of cystoscopy  Right Ureteroscopy, stent insertion ---2021    History of strabismus surgery  Bilateral as a child    Bilateral cataracts    S/P ureteral stent placement  and exchange 2022        SOCIAL HX:    Hx opiate tolerance ( )YES  ( )NO    Baseline ADLs  (Prior to Admission)  ( ) Independent   ( )Dependent    FAMILY HISTORY:  FH: breast cancer (Mother)  Age 37,     FH: lung cancer  Age 63,         Review of Systems:    Anxiety-  Depression-  Physical Discomfort-  Dyspnea-  Constipation-  Diarrhea-  Nausea-  Vomiting-  Anorexia-  Weight Loss-   Cough-  Secretions-  Fatigue-  Weakness-  Delirium-    All other systems reviewed and negative  Unable to obtain/Limited due to:      PHYSICAL EXAM:    Vital Signs Last 24 Hrs  T(C): 36.1 (30 Mar 2022 08:33), Max: 36.9 (30 Mar 2022 05:30)  T(F): 96.9 (30 Mar 2022 08:33), Max: 98.4 (30 Mar 2022 05:30)  HR: 74 (30 Mar 2022 08:33) (68 - 79)  BP: 180/71 (30 Mar 2022 08:33) (163/68 - 180/71)  BP(mean): --  RR: 17 (30 Mar 2022 08:33) (17 - 20)  SpO2: 98% (30 Mar 2022 08:33) (98% - 99%)  Daily     Daily     PPSV2:   %  FAST:    General:  Mental Status:  HEENT:  Lungs:  Cardiac:  GI:  :  Ext:  Neuro:      LABS:                        12.0   10.16 )-----------( 326      ( 30 Mar 2022 06:11 )             36.2     03    142  |  111<H>  |  30<H>  ----------------------------<  125<H>  3.4<L>   |  25  |  1.40<H>    Ca    8.2<L>      30 Mar 2022 06:11  Phos  2.0       Mg     2.3         TPro  5.8<L>  /  Alb  2.3<L>  /  TBili  6.5<H>  /  DBili  x   /  AST  121<H>  /  ALT  152<H>  /  AlkPhos  1128<H>      PT/INR - ( 29 Mar 2022 07:37 )   PT: 13.6 sec;   INR: 1.17 ratio         PTT - ( 29 Mar 2022 07:37 )  PTT:31.0 sec  Albumin: Albumin, Serum: 2.3 g/dL ( @ 06:11)      Allergies    adhesives (Rash)  No Known Drug Allergies    Intolerances      MEDICATIONS  (STANDING):  dextrose 5% + sodium chloride 0.9%. 1000 milliLiter(s) (80 mL/Hr) IV Continuous <Continuous>  enoxaparin Injectable 40 milliGRAM(s) SubCutaneous every 24 hours  pantoprazole  Injectable 40 milliGRAM(s) IV Push two times a day  Parenteral Nutrition - Adult 1 Each (83 mL/Hr) TPN Continuous <Continuous>  timolol 0.5% Solution 1 Drop(s) Both EYES two times a day    MEDICATIONS  (PRN):  acetaminophen  Suppository .. 650 milliGRAM(s) Rectal every 4 hours PRN Mild Pain (1 - 3)  hydrALAZINE Injectable 10 milliGRAM(s) IV Push every 6 hours PRN sbp>140  lidocaine 2% Viscous 5 milliLiter(s) Swish and Spit every 6 hours PRN Mouth Care  morphine  - Injectable 2 milliGRAM(s) IV Push every 4 hours PRN Moderate Pain (4 - 6)  morphine  - Injectable 2 milliGRAM(s) IV Push every 2 hours PRN Severe Pain (7 - 10)      RADIOLOGY/ADDITIONAL STUDIES:   HPI:      87M, pmh of HTN, HLD, Hiatal hernia,  right ureteral stricture s/p stent and exchange who was found to have a large Right sided RP mass (most recently 4.4X2.8X11cm) and with poor po intake, 30 pound weight loss,      He had undergone an unsuccessful EUS attempt due to near complete obstruction 3/28 and was referred for admission.   Had NGT placed with significant outpt.     3/30/22  pt seen and examined  NGT in place  pt in NAD no nausea vomiting sob   frail appearing, reports generalized weakness        PAIN: ( )Yes   (x )No     DYSPNEA: ( ) Yes  (x ) No  Level:    PAST MEDICAL & SURGICAL HISTORY:  Hypercholesteremia    Hypertension    COVID-19 vaccine series completed  moderna- 2021    Ureteral stricture, right    Arthritis    Abnormal finding on GI tract imaging    Frequent UTI    Hiatal hernia    Retroperitoneal mass  Right    S/P hip replacement, right  2010    History of bilateral knee replacement  ,     History of transurethral prostatectomy      History of cystoscopy  Right Ureteroscopy, stent insertion ---2021    History of strabismus surgery  Bilateral as a child    Bilateral cataracts    S/P ureteral stent placement  and exchange 2022        SOCIAL HX:    Hx opiate tolerance ( )YES  (x )NO    Baseline ADLs  (Prior to Admission)  (x ) Independent   ( )Dependent    FAMILY HISTORY:  FH: breast cancer (Mother)  Age 37,     FH: lung cancer  Age 63,         Review of Systems:    Anxiety- denies   Depression-denies   Physical Discomfort-denies   Dyspnea-denies    Constipation- has had issues on and off w/ constipation for >1 year  Diarrhea- denies   Nausea- denies   Vomiting- denies   Anorexia- ++   Weight Loss-  significant weight loss  Cough- denies  Secretions- denies  Fatigue- reports  + fatigue  Weakness- generalized weakness  Delirium- none    All other systems reviewed and negative       PHYSICAL EXAM:    Vital Signs Last 24 Hrs  T(C): 36.1 (30 Mar 2022 08:33), Max: 36.9 (30 Mar 2022 05:30)  T(F): 96.9 (30 Mar 2022 08:33), Max: 98.4 (30 Mar 2022 05:30)  HR: 74 (30 Mar 2022 08:33) (68 - 79)  BP: 180/71 (30 Mar 2022 08:33) (163/68 - 180/71)  BP(mean): --  RR: 17 (30 Mar 2022 08:33) (17 - 20)  SpO2: 98% (30 Mar 2022 08:33) (98% - 99%)  Daily     Daily     PPSV2: 40   %  FAST:    General: cachectic , weak, friall  Mental Status: oriented x 3 w. capacity  HEENT: eomi  Lungs: ctabl no wheezing or rales   Cardiac: s1s2 no mgr  GI: soft nontender+BS NGT in place  : voids  Ext: moves all 4 ext. spontaneously  Neuro:      LABS:                        12.0   10.16 )-----------( 326      ( 30 Mar 2022 06:11 )             36.2     03    142  |  111<H>  |  30<H>  ----------------------------<  125<H>  3.4<L>   |  25  |  1.40<H>    Ca    8.2<L>      30 Mar 2022 06:11  Phos  2.0       Mg     2.3         TPro  5.8<L>  /  Alb  2.3<L>  /  TBili  6.5<H>  /  DBili  x   /  AST  121<H>  /  ALT  152<H>  /  AlkPhos  1128<H>      PT/INR - ( 29 Mar 2022 07:37 )   PT: 13.6 sec;   INR: 1.17 ratio         PTT - ( 29 Mar 2022 07:37 )  PTT:31.0 sec  Albumin: Albumin, Serum: 2.3 g/dL ( @ 06:11)      Allergies    adhesives (Rash)  No Known Drug Allergies    Intolerances      MEDICATIONS  (STANDING):  dextrose 5% + sodium chloride 0.9%. 1000 milliLiter(s) (80 mL/Hr) IV Continuous <Continuous>  enoxaparin Injectable 40 milliGRAM(s) SubCutaneous every 24 hours  pantoprazole  Injectable 40 milliGRAM(s) IV Push two times a day  Parenteral Nutrition - Adult 1 Each (83 mL/Hr) TPN Continuous <Continuous>  timolol 0.5% Solution 1 Drop(s) Both EYES two times a day    MEDICATIONS  (PRN):  acetaminophen  Suppository .. 650 milliGRAM(s) Rectal every 4 hours PRN Mild Pain (1 - 3)  hydrALAZINE Injectable 10 milliGRAM(s) IV Push every 6 hours PRN sbp>140  lidocaine 2% Viscous 5 milliLiter(s) Swish and Spit every 6 hours PRN Mouth Care  morphine  - Injectable 2 milliGRAM(s) IV Push every 4 hours PRN Moderate Pain (4 - 6)  morphine  - Injectable 2 milliGRAM(s) IV Push every 2 hours PRN Severe Pain (7 - 10)      RADIOLOGY/ADDITIONAL STUDIES:

## 2022-03-30 NOTE — CHART NOTE - NSCHARTNOTEFT_GEN_A_CORE
HPI:  87 y.o. male with large obstructive retroperitoneal mass s/p unsuccessful EUS attempt due to near complete stomach obstruction with decreased po intake and 30lb weight loss now admitted for IR bx in am and possible stent placement. (28 Mar 2022 14:43)      PERTINENT PMH REVIEWED:  [ X ] YES [ ] NO           Primary Contact:  HCP on file naming son Trell as primary and daughter Anahy as alternate                      HCP [ X ] Surrogate [   ] Guardian [   ]    Mental Status: [ X ] Alert  [  X  ] Oriented [  ] Confused [  ] Lethargic [  ]  Concerns of Depression [  ]- not identified   Anxiety [   ]- not identified   Baseline ADLs (prior to admission):  Independent [ X ] moderately [ ] fully   Dependent   [ ] moderately [ ]fully    Family Meeting: GOC discussion started today with pt. He would like to have endoscopy done Friday before having any further discussions     Anticipated Grief: Patient [ X ] Family [  ]    Caregiver [place] Assessed: Yes [ X ] No [  ]    Buddhist: Unknown     Spiritual Concerns: Not identified    Goals of Care: to be further determined, pt. would like endoscopy done however, is clear he knows he has a mass and his time is likely short therefore, he does wish to be comfortable and started to think about hospice. Further conversation to occur after Endoscopy.     Previous Services: had home care in past     ADVANCE DIRECTIVES: DAVIDST on chart stating DNR/DNI, No Feeding Tube    Anticipated D/C Plan: to be further determined, pt. did share he does not feel he can go home at this point                      Summary:    Dr. Harrington and this SW met with pt. at [facility] to discuss goals of care, assist with planning and provide supportive counseling. Pt. was residing home with his wife prior to admission. 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 She is currently at [facility]. Pts  reports that he needs to discuss with his children next steps in regards to his wife and with what is happening with his health so that he can prepare them. Feelings explored and support provided.    Pts current medical condition and goals of care discussed. Pt. shared that he is scheduled to go for a Endoscopy on Friday and will have further discussion about the plan after. He reports that he is hoping they can place a stent for comfort but either way he knows he has a mass and shared that his time is likely limited. He reports he does not wish to artificially prolong his life and wants to be comfortable. He discussed hospice briefly but reports he wants to determine plan further after the endoscopy and also talk to his children. He did share that he does not think he can return home at this point as his care is more than can be managed there as he was the caretaker for his wife. Pt. was open to having further discussion about plan after endoscopy.    MOLST was reviewed and completed. It states DNR/DNI, No feeding tube. Pt. clearly stated that for the Endoscopy he would not want DNR/DNI rescinded.    Plan at this chuy eis to be further determined after Endoscopy Friday. Emotional support provided. Our team will continue to follow.

## 2022-03-30 NOTE — CHART NOTE - NSCHARTNOTEFT_GEN_A_CORE
Discussed with Dr. Magallon and Dr. Freeman. Patient and family in agreement for EGD on Friday 4/1 with stent placement.   Palliative team to be involved in care.

## 2022-03-30 NOTE — CONSULT NOTE ADULT - CONVERSATION DETAILS
We discussed Palliative Care team being a supportive team when a patient has ongoing illnesses.  We also discussed that it is not an end of life care service, but can help navigate symptoms and emotional support throughout their hospital stay here.     Hospice was explained as well as an end of life care philosophy. When a disease cannot be cured, or family/patient decide the treatment burdens out weight the risk and chose to change focus of treatment from cure to quality/comfort.       Pt states he understands he is getting a stent for symptoms and 'palliative treatment'.  He knows that this mass is likely malignant and we're talking about comfort/hospice situation at that stage.     He does want to proceed with this procedure he asks that his DNR DNI NO FEEDING TUBE stay active even during the procedure.  He understands it is often rescinded (after discussion with patients) but he does not feel he wants to rescind.     I encouraged him to talk with treatment team regarding this as well.  Risk and benefits of the decision and were discussed at bedside.  Further discussion regarding procedure risk/benefits deferred to that procedural team.

## 2022-03-30 NOTE — PHYSICAL THERAPY INITIAL EVALUATION ADULT - GENERAL OBSERVATIONS, REHAB EVAL
Pt rec'd supine in bed, +NGT, cooperative with PT, endorses feeling stiff from being in bed for the past few days.

## 2022-03-30 NOTE — PROGRESS NOTE ADULT - SUBJECTIVE AND OBJECTIVE BOX
Patient was seen and evaluated at bedside. No acute events overnight. NGT remains in place. No f/c/cp/sob/n/v. VS reviewed.    Vital Signs Last 24 Hrs  T(C): 36.9 (30 Mar 2022 05:30), Max: 36.9 (30 Mar 2022 05:30)  T(F): 98.4 (30 Mar 2022 05:30), Max: 98.4 (30 Mar 2022 05:30)  HR: 70 (30 Mar 2022 05:30) (68 - 79)  BP: 177/78 (30 Mar 2022 05:30) (159/76 - 177/78)  BP(mean): --  RR: 17 (30 Mar 2022 05:30) (16 - 20)  SpO2: 99% (30 Mar 2022 05:30) (97% - 99%)    Physical Exam:  General: AAOx3, cachetic, elderly male  HEENT: NC/AT, EOMI, NGT in place  Cardio: S1S2, RRR  Pulm: equal air entry b/l, non labored  Abdomen: soft, NT/ND    I&O's Summary    28 Mar 2022 07:01  -  29 Mar 2022 07:00  --------------------------------------------------------  IN: 1972 mL / OUT: 25 mL / NET: 1947 mL    29 Mar 2022 07:01  -  30 Mar 2022 06:37  --------------------------------------------------------  IN: 800 mL / OUT: 865 mL / NET: -65 mL      I&O's Detail    28 Mar 2022 07:01  -  29 Mar 2022 07:00  --------------------------------------------------------  IN:    Lactated Ringers: 225 mL    Other (mL): 1000 mL    PPN (Peripheral Parenteral Nutrition): 747 mL  Total IN: 1972 mL    OUT:    Nasogastric/Oral tube (mL): 25 mL    Voided (mL): 0 mL  Total OUT: 25 mL    Total NET: 1947 mL      29 Mar 2022 07:01  -  30 Mar 2022 06:37  --------------------------------------------------------  IN:    dextrose 5% + sodium chloride 0.9%: 800 mL  Total IN: 800 mL    OUT:    Nasogastric/Oral tube (mL): 215 mL    Voided (mL): 650 mL  Total OUT: 865 mL    Total NET: -65 mL          MEDICATIONS  (STANDING):  dextrose 5% + sodium chloride 0.9%. 1000 milliLiter(s) (80 mL/Hr) IV Continuous <Continuous>  enoxaparin Injectable 40 milliGRAM(s) SubCutaneous every 24 hours  pantoprazole  Injectable 40 milliGRAM(s) IV Push two times a day  Parenteral Nutrition - Adult 1 Each (83 mL/Hr) TPN Continuous <Continuous>  timolol 0.5% Solution 1 Drop(s) Both EYES two times a day    MEDICATIONS  (PRN):  acetaminophen  Suppository .. 650 milliGRAM(s) Rectal every 4 hours PRN Mild Pain (1 - 3)  hydrALAZINE Injectable 10 milliGRAM(s) IV Push every 8 hours PRN SBP>140  lidocaine 2% Viscous 5 milliLiter(s) Swish and Spit every 6 hours PRN Mouth Care  morphine  - Injectable 2 milliGRAM(s) IV Push every 4 hours PRN Moderate Pain (4 - 6)  morphine  - Injectable 2 milliGRAM(s) IV Push every 2 hours PRN Severe Pain (7 - 10)      LABS:                        12.0   10.16 )-----------( 326      ( 30 Mar 2022 06:11 )             36.2     03-29    142  |  110<H>  |  30<H>  ----------------------------<  133<H>  3.7   |  25  |  1.68<H>    Ca    9.0      29 Mar 2022 07:37  Phos  2.7     03-29  Mg     2.6     03-29    TPro  6.4  /  Alb  2.5<L>  /  TBili  6.4<H>  /  DBili  5.5<H>  /  AST  155<H>  /  ALT  177<H>  /  AlkPhos  1274<H>  03-29    PT/INR - ( 29 Mar 2022 07:37 )   PT: 13.6 sec;   INR: 1.17 ratio         PTT - ( 29 Mar 2022 07:37 )  PTT:31.0 sec      RADIOLOGY & ADDITIONAL STUDIES:

## 2022-03-30 NOTE — PROGRESS NOTE ADULT - SUBJECTIVE AND OBJECTIVE BOX
C/C: Decreased po intake.     HPI:  87M, pmh of HTN, HLD, Hiatal hernia,  right ureteral stricture s/p stent and exchange who was found to have a large Right sided RP mass (most recently 4.4X2.8X11cm) and with poor po intake, 30 pound weight loss,   He had undergone an unsuccessful EUS attempt due to near complete obstruction 3/28 and was referred for admission.   Had NGT placed with significant outpt.     pt seen and examined this am. Feeling better. no bm. +Flatus. some abd discomfort, but overall better. NGT remains in place.    Review of system- All 10 systems reviewed and is as per HPI otherwise negative.     Vital Signs Last 24 Hrs  T(C): 36.3 (30 Mar 2022 12:50), Max: 36.9 (30 Mar 2022 05:30)  T(F): 97.4 (30 Mar 2022 12:50), Max: 98.4 (30 Mar 2022 05:30)  HR: 67 (30 Mar 2022 12:50) (67 - 79)  BP: 165/74 (30 Mar 2022 12:50) (163/71 - 180/71)  RR: 18 (30 Mar 2022 12:50) (17 - 20)  SpO2: 99% (30 Mar 2022 12:50) (98% - 99%)    PHYSICAL EXAM:    GENERAL: Comfortable, no acute distress, jaundiced, cachectic.   HEAD:  Atraumatic, Normocephalic  EYES: EOMI, PERRLA, +Scleral icterus  HEENT: dry  mucous membranes, NGT+  NECK: Supple, No JVD  NERVOUS SYSTEM:  Alert & Oriented X3, Motor Strength 5/5 B/L upper and lower extremities  CHEST/LUNG: Clear to auscultation bilaterally  HEART: Regular rate and rhythm; No murmurs, rubs, or gallops  ABDOMEN: Soft, Nontender, Nondistended; Bowel sounds present  GENITOURINARY- Voiding, no palpable bladder  EXTREMITIES:  No clubbing, cyanosis, or edema  MUSCULOSKELETAL- No muscle tenderness, No joint tenderness  SKIN-no rash  LABS:                        12.0   10.16 )-----------( 326      ( 30 Mar 2022 06:11 )             36.2     03-30    142  |  111<H>  |  30<H>  ----------------------------<  125<H>  3.4<L>   |  25  |  1.40<H>    Ca    8.2<L>      30 Mar 2022 06:11  Phos  2.0     03-30  Mg     2.3     03-30    TPro  5.8<L>  /  Alb  2.3<L>  /  TBili  6.5<H>  /  DBili  x   /  AST  121<H>  /  ALT  152<H>  /  AlkPhos  1128<H>  03-30    PT/INR - ( 29 Mar 2022 07:37 )   PT: 13.6 sec;   INR: 1.17 ratio         PTT - ( 29 Mar 2022 07:37 )  PTT:31.0 sec      \MEDICATIONS  (STANDING):  dextrose 5% + sodium chloride 0.9%. 1000 milliLiter(s) (80 mL/Hr) IV Continuous <Continuous>  enoxaparin Injectable 40 milliGRAM(s) SubCutaneous every 24 hours  pantoprazole  Injectable 40 milliGRAM(s) IV Push two times a day  Parenteral Nutrition - Adult 1 Each (83 mL/Hr) TPN Continuous <Continuous>  Parenteral Nutrition - Adult 1 Each (83 mL/Hr) TPN Continuous <Continuous>  timolol 0.5% Solution 1 Drop(s) Both EYES two times a day    MEDICATIONS  (PRN):  acetaminophen  Suppository .. 650 milliGRAM(s) Rectal every 4 hours PRN Mild Pain (1 - 3)  hydrALAZINE Injectable 10 milliGRAM(s) IV Push every 6 hours PRN sbp>140  lidocaine 2% Viscous 5 milliLiter(s) Swish and Spit every 6 hours PRN Mouth Care  morphine  - Injectable 2 milliGRAM(s) IV Push every 4 hours PRN Moderate Pain (4 - 6)  morphine  - Injectable 2 milliGRAM(s) IV Push every 2 hours PRN Severe Pain (7 - 10)    ASSESSMENT AND PLAN:  87m, PMH as above a/w:    1. 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  -plan is for EUS/biliary stent friday.      2. Elevated LFTs:  -d/t obstruction  -outpt ct showed intrahepatic biliary ductal dilation + CBD dilation to 1.9cm @ level of RP mass.   -biliary stent friday.     3. Renal failure/CKD III:  -likely partly obstructive  -had right ureteral stent placed prior to admission.   -also has stones left UV junction without hydro.     4. HTN:  -norvasc on hold while NPO  -prn hydralazine.     5. HLD:  -hold statin while npo.     6. Glaucoma:  -continue gtts.    7.  DVT px:  -lovenox.     dispo:  palliative eval appreciated.   pt is DNR/DNI and does not want it rescinded during procedure.   prognosis poor.   pt is aware.

## 2022-03-30 NOTE — PHYSICAL THERAPY INITIAL EVALUATION ADULT - PERTINENT HX OF CURRENT PROBLEM, REHAB EVAL
87M, pmh of HTN, HLD, Hiatal hernia,  right ureteral stricture s/p stent and exchange who was found to have a large Right sided RP mass (most recently 4.4X2.8X11cm) and with poor po intake, 30 pound weight loss, He had undergone an unsuccessful EUS attempt due to near complete obstruction 3/28 and was referred for admission. Had NGT placed with significant outpt.

## 2022-03-30 NOTE — PROGRESS NOTE ADULT - ASSESSMENT
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    Plan:  -continue with NGT to LWS  -flush NGT q4h to ensure patency  -GI on board, for repeat EUS on Friday for stent placement  -medical management as per primary team  -surgical oncology will follow    Plan discussed with Dr. Dial

## 2022-03-31 LAB
ALBUMIN SERPL ELPH-MCNC: 2.3 G/DL — LOW (ref 3.3–5)
ALP SERPL-CCNC: 1003 U/L — HIGH (ref 40–120)
ALT FLD-CCNC: 141 U/L — HIGH (ref 12–78)
ANION GAP SERPL CALC-SCNC: 7 MMOL/L — SIGNIFICANT CHANGE UP (ref 5–17)
AST SERPL-CCNC: 126 U/L — HIGH (ref 15–37)
BILIRUB SERPL-MCNC: 5.7 MG/DL — HIGH (ref 0.2–1.2)
BUN SERPL-MCNC: 40 MG/DL — HIGH (ref 7–23)
CALCIUM SERPL-MCNC: 8.6 MG/DL — SIGNIFICANT CHANGE UP (ref 8.5–10.1)
CHLORIDE SERPL-SCNC: 112 MMOL/L — HIGH (ref 96–108)
CO2 SERPL-SCNC: 25 MMOL/L — SIGNIFICANT CHANGE UP (ref 22–31)
CREAT SERPL-MCNC: 1.36 MG/DL — HIGH (ref 0.5–1.3)
EGFR: 50 ML/MIN/1.73M2 — LOW
GLUCOSE SERPL-MCNC: 124 MG/DL — HIGH (ref 70–99)
MAGNESIUM SERPL-MCNC: 2.3 MG/DL — SIGNIFICANT CHANGE UP (ref 1.6–2.6)
PHOSPHATE SERPL-MCNC: 2.2 MG/DL — LOW (ref 2.5–4.5)
POTASSIUM SERPL-MCNC: 3.4 MMOL/L — LOW (ref 3.5–5.3)
POTASSIUM SERPL-SCNC: 3.4 MMOL/L — LOW (ref 3.5–5.3)
PROT SERPL-MCNC: 5.7 GM/DL — LOW (ref 6–8.3)
SARS-COV-2 RNA SPEC QL NAA+PROBE: SIGNIFICANT CHANGE UP
SODIUM SERPL-SCNC: 144 MMOL/L — SIGNIFICANT CHANGE UP (ref 135–145)

## 2022-03-31 PROCEDURE — 99232 SBSQ HOSP IP/OBS MODERATE 35: CPT

## 2022-03-31 RX ORDER — ELECTROLYTE SOLUTION,INJ
1 VIAL (ML) INTRAVENOUS
Refills: 0 | Status: DISCONTINUED | OUTPATIENT
Start: 2022-03-31 | End: 2022-03-31

## 2022-03-31 RX ORDER — I.V. FAT EMULSION 20 G/100ML
0.69 EMULSION INTRAVENOUS
Qty: 50 | Refills: 0 | Status: DISCONTINUED | OUTPATIENT
Start: 2022-03-31 | End: 2022-03-31

## 2022-03-31 RX ADMIN — PANTOPRAZOLE SODIUM 40 MILLIGRAM(S): 20 TABLET, DELAYED RELEASE ORAL at 21:15

## 2022-03-31 RX ADMIN — Medication 1 DROP(S): at 10:21

## 2022-03-31 RX ADMIN — Medication 10 MILLIGRAM(S): at 18:10

## 2022-03-31 RX ADMIN — PANTOPRAZOLE SODIUM 40 MILLIGRAM(S): 20 TABLET, DELAYED RELEASE ORAL at 10:20

## 2022-03-31 RX ADMIN — Medication 1 DROP(S): at 21:14

## 2022-03-31 RX ADMIN — I.V. FAT EMULSION 20.8 GM/KG/DAY: 20 EMULSION INTRAVENOUS at 22:46

## 2022-03-31 RX ADMIN — ENOXAPARIN SODIUM 40 MILLIGRAM(S): 100 INJECTION SUBCUTANEOUS at 16:02

## 2022-03-31 RX ADMIN — Medication 10 MILLIGRAM(S): at 04:50

## 2022-03-31 RX ADMIN — Medication 10 MILLIGRAM(S): at 10:21

## 2022-03-31 RX ADMIN — Medication 1 EACH: at 22:45

## 2022-03-31 NOTE — PROGRESS NOTE ADULT - SUBJECTIVE AND OBJECTIVE BOX
Patient : Stephanie Hidalgo Age: 34year old Sex: female   MRN: [id number] Encounter Date: 2018      History     Chief Complaint   Patient presents with   â¢ Abdominal Pain     77-year-old female past medical history of constipation GERD presents with a chief complaint of epigastric pain Ã1 week. Patient is unable to describe the pain. She denies radiation. She denies provoking or palliative measures. Specifically, she denies pain provoked by inspiration or position. She endorses subjective chills and nausea. She denies vomiting. She endorses an episode of dizziness earlier today. She states she laid down quickly and had an episode of loss of consciousness. No head injury. She denies chest pain, shows of breath, lower abdominal pain, dysuria, hematuria, or vaginal discharge. She endorses normal bowel movements, last bowel movement earlier today and was normal. Patient was evaluated 2 months ago for GERD and constipation. She was given PPI and H2 blocker with relief. She states this episode feels similar to that episode. No Known Allergies    This SmartLink is deprecated. Use AVOncoEthixEDLIST instead to display the medication list for a patient. This SmartLink is deprecated. Use Footbalistic instead to display the medication list for a patient. Past Medical History:   Diagnosis Date   â¢ Anemia of pregnancy in second trimester 2015   â¢ Candidiasis of vulva and vagina 2015   â¢ Dizziness 2015   â¢ Supervision of normal pregnancy 2015   â¢ Threatened premature labor, antepartum(644.03) 2015    9/8/15 FFN: negative,  Cervix: closed        Past Surgical History:   Procedure Laterality Date   â¢  SECTION, LOW TRANSVERSE         No family history on file.     Social History     Tobacco Use   â¢ Smoking status: Never Smoker   â¢ Smokeless tobacco: Never Used   Substance Use Topics   â¢ Alcohol use: No     Alcohol/week: 0.0 oz   â¢ Drug use: No       Review of Systems   Constitutional: "See HPI. Respiratory:        See HPI. Cardiovascular:        See HPI. Gastrointestinal:        See HPI. Genitourinary:        See HPI. Skin: Negative for color change, pallor, rash and wound. Neurological:        See HPI. All other systems reviewed and are negative. Physical Exam     ED Triage Vitals [11/17/18 2346]   ED Triage Vitals Group      Temp 98.2 Â°F (36.8 Â°C)      Pulse 71      Resp 18      BP 99/67      SpO2 97 %      EtCO2 mmHg       Height 5' 2"" (1.575 m)      Weight 114 lb 13.8 oz (52.1 kg)      Weight Scale Used ED Actual       Physical Exam   Constitutional: She is oriented to person, place, and time. She appears well-developed and well-nourished. No distress. HENT:   Head: Normocephalic and atraumatic. Nose: Nose normal.   Eyes: Conjunctivae and EOM are normal. Pupils are equal, round, and reactive to light. Right eye exhibits no discharge. Left eye exhibits no discharge. No scleral icterus. Neck: Normal range of motion. No tracheal deviation present. Cardiovascular: Normal rate, regular rhythm, normal heart sounds and intact distal pulses. Exam reveals no gallop and no friction rub. No murmur heard. Pulmonary/Chest: Effort normal and breath sounds normal. No respiratory distress. She has no wheezes. She has no rales. Abdominal: Soft. Bowel sounds are normal. She exhibits no distension and no mass. There is no hepatosplenomegaly. There is no tenderness. There is no rigidity, no rebound, no guarding, no tenderness at McBurney's point and negative Ames's sign. Musculoskeletal: Normal range of motion. She exhibits no edema, tenderness or deformity. Neurological: She is alert and oriented to person, place, and time. No cranial nerve deficit. Coordination normal.   Skin: Skin is warm and dry. No rash noted. She is not diaphoretic. No erythema. No pallor. Nursing note and vitals reviewed.       ED Course     Procedures    Lab Results     Results for orders placed or " performed during the hospital encounter of 11/18/18   CBC & Auto Differential   Result Value Ref Range    WBC 6.1 4.2 - 11.0 K/mcL    RBC 4.97 4.00 - 5.20 mil/mcL    HGB 11.5 (L) 12.0 - 15.5 g/dL    HCT 36.6 36.0 - 46.5 %    MCV 73.6 (L) 78.0 - 100.0 fl    MCH 23.1 (L) 26.0 - 34.0 pg    MCHC 31.4 (L) 32.0 - 36.5 g/dL    RDW-CV 14.6 11.0 - 15.0 %     140 - 450 K/mcL    NRBC 0 0 /100 WBC    DIFF TYPE AUTOMATED DIFFERENTIAL     Neutrophil 55 %    LYMPH 34 %    MONO 9 %    EOSIN 1 %    BASO 1 %    Percent Immature Granuloctyes 0 %    Absolute Neutrophil 3.3 1.8 - 7.7 K/mcL    Absolute Lymph 2.1 1.0 - 4.8 K/mcL    Absolute Mono 0.6 0.3 - 0.9 K/mcL    Absolute Eos 0.0 (L) 0.1 - 0.5 K/mcL    Absolute Baso 0.0 0.0 - 0.3 K/mcL    Absolute Immature Granulocytes 0.0 0 - 0.2 K/mcl   Comprehensive Metabolic Panel   Result Value Ref Range    Sodium 137 135 - 145 mmol/L    Potassium 3.4 3.4 - 5.1 mmol/L    Chloride 103 98 - 107 mmol/L    Carbon Dioxide 23 21 - 32 mmol/L    Anion Gap 14 10 - 20 mmol/L    Glucose 94 65 - 99 mg/dL    BUN 8 6 - 20 mg/dL    Creatinine 0.42 (L) 0.51 - 0.95 mg/dL    GFR Estimate,  >90     GFR Estimate, Non African American >90     BUN/Creatinine Ratio 19 7 - 25    CALCIUM 8.7 8.4 - 10.2 mg/dL    TOTAL BILIRUBIN 0.1 (L) 0.2 - 1.0 mg/dL    AST/SGOT 18 <38 Units/L    ALT/SGPT 22 <79 Units/L    ALK PHOSPHATASE 54 45 - 117 Units/L    TOTAL PROTEIN 7.4 6.4 - 8.2 g/dL    Albumin 3.5 (L) 3.6 - 5.1 g/dL    GLOBULIN 3.9 2.0 - 4.0 g/dL    A/G Ratio, Serum 0.9 (L) 1.0 - 2.4   Lipase Level   Result Value Ref Range    Lipase 215 73 - 393 Units/L   Macro Urine - Point of Care   Result Value Ref Range    COLOR-POC YELLOW YELLOW    APPEARANCE-POC CLEAR     GLUCOSE-POC NEGATIVE NEGATIVE mg/dL    BILIRUBIN-POC NEGATIVE NEGATIVE    KETONES-POC NEGATIVE NEGATIVE mg/dL    SPECIFIC GRAVITY-POC 1.015 1.005 - 1.030    OCCULT BLOOD-POC NEGATIVE NEGATIVE    PH-POC 7.0 5.0 - 7.0 Units    PROTEIN-POC NEGATIVE NEGATIVE mg/dL    UROBILINOGEN-POC 0.2 0.0 - 1.0 mg/dL    NITRITE-POC NEGATIVE NEGATIVE    WBC (Leukocyte) Esterase POC NEGATIVE NEGATIVE   Urine Pregnancy Test Clinitek Status (POC)   Result Value Ref Range    HCG Point of Care POSITIVE (A) NEGATIVE   Beta hCG Quantitative Pregnancy   Result Value Ref Range    HCG Quantitative 213,117 (H) <5 mUnits/mL   POCT Urine pregnancy   Result Value Ref Range    URINE PREGNANCY,QUAL Positive        EKG Results     EKG Interpretation  Rate: 77  Rhythm: normal sinus rhythm with sinus arrhythmia  Abnormality: no    EKG interpreted by ED physician    Radiology Results     Imaging Results          US OB < 14 Weeks Single or First (Final result)  Result time 11/18/18 03:46:38    Final result                 Impression:    IMPRESSION:    Single live intrauterine gestation, with estimated gestational age of 8  weeks 5 days, for an GENARO of 6/25/2019. Annie Sprague  [id number]    OB ULTRASOUND  pain    CLINICAL INFORMATION:         COMPARISON:  None. TECHNIQUE:  Real-time transabdominal scanning was performed by the  sonographic technologist. Static images are reviewed. FINDINGS:      The uterus measures 11.7 x 7.4 x 5.3 cm. There is a single live intrauterine gestation with fetal heart rate  measured at 165 bpm. The mean sac diameter is 4.1 cm. The crown-rump length  is 2.1 cm. The estimated gestational age is 8 weeks 5 days. The yolk sac  size is 3.9 mm. The right ovary measures 1.3 x 1.8 x 0.8 cm. The left ovary measures 2.9 x  2.9 x 2.2 cm and contains a 1.6 cm cyst.    A small amount of free fluid is noted.                                 ED Medication Orders (From admission, onward)    Start Ordered     Status Ordering Provider    11/18/18 0032 11/18/18 0031  famotidine (PEPCID) injection 20 mg  ONCE      Last MAR action:  Given Lloydcolton Bhatt    11/18/18 0031 11/18/18 0031  aluminum-magnesium hydroxide-simethicone (Sylvia Pleitez) [phone] Patient was seen and evaluated at bedside. No acute events overnight. NGT remains in place. No bowel function. No f/c/cp/sob/n/v. VS reviewed.    Vital Signs Last 24 Hrs  T(C): 36.1 (31 Mar 2022 09:13), Max: 36.8 (30 Mar 2022 21:30)  T(F): 96.9 (31 Mar 2022 09:13), Max: 98.3 (30 Mar 2022 21:30)  HR: 73 (31 Mar 2022 09:13) (63 - 73)  BP: 174/78 (31 Mar 2022 09:13) (138/57 - 174/78)  BP(mean): --  RR: 18 (31 Mar 2022 09:13) (16 - 18)  SpO2: 95% (31 Mar 2022 09:13) (95% - 99%)    Physical Exam:  General: AAOx3, cachetic, elderly male  HEENT: NC/AT, EOMI, NGT in place  Cardio: S1S2, RRR  Pulm: equal air entry b/l, non labored  Abdomen: soft, NT/ND      I&O's Detail    30 Mar 2022 07:01  -  31 Mar 2022 07:00  --------------------------------------------------------  IN:    PPN (Peripheral Parenteral Nutrition): 747 mL  Total IN: 747 mL    OUT:    Nasogastric/Oral tube (mL): 450 mL    Voided (mL): 950 mL  Total OUT: 1400 mL    Total NET: -653 mL      MEDICATIONS  (STANDING):  dextrose 5% + sodium chloride 0.9%. 1000 milliLiter(s) (80 mL/Hr) IV Continuous <Continuous>  enoxaparin Injectable 40 milliGRAM(s) SubCutaneous every 24 hours  pantoprazole  Injectable 40 milliGRAM(s) IV Push two times a day  Parenteral Nutrition - Adult 1 Each (83 mL/Hr) TPN Continuous <Continuous>  timolol 0.5% Solution 1 Drop(s) Both EYES two times a day    MEDICATIONS  (PRN):  acetaminophen  Suppository .. 650 milliGRAM(s) Rectal every 4 hours PRN Mild Pain (1 - 3)  hydrALAZINE Injectable 10 milliGRAM(s) IV Push every 8 hours PRN SBP>140  lidocaine 2% Viscous 5 milliLiter(s) Swish and Spit every 6 hours PRN Mouth Care  morphine  - Injectable 2 milliGRAM(s) IV Push every 4 hours PRN Moderate Pain (4 - 6)  morphine  - Injectable 2 milliGRAM(s) IV Push every 2 hours PRN Severe Pain (7 - 10)      LABS:                                   12.0   10.16 )-----------( 326      ( 30 Mar 2022 06:11 )             36.2   03-30    142  |  111<H>  |  30<H>  ----------------------------<  125<H>  3.4<L>   |  25  |  1.40<H>    Ca    8.2<L>      30 Mar 2022 06:11  Phos  2.0     03-30  Mg     2.3     03-30    TPro  5.8<L>  /  Alb  2.3<L>  /  TBili  6.5<H>  /  DBili  x   /  AST  121<H>  /  ALT  152<H>  /  AlkPhos  1128<H>  03-30        RADIOLOGY & ADDITIONAL STUDIES: MG/5ML suspension 30 mL  (GI Cocktail)  PRN      Last MAR action:  Given Rader Mini    11/18/18 0031 11/18/18 0031  lidocaine viscous (XYLOCAINE) 2 % oral solution 15 mL  (GI Cocktail)  PRN      Last MAR action:  Given Marty GOETZ  Number of Diagnoses or Management Options  Epigastric pain:   Incidental pregnancy:   Diagnosis management comments: 66-year-old female past medical history of constipation GERD presents with a chief complaint of epigastric pain Ã1 week. Patient is afebrile with stable vital signs. She appears uncomfortable on exam. She is alert and oriented. She is a good historian. Abdomen is soft and nontender. No abdominal distention, guarding, rigidity, or rebound tenderness. McBurney's point negative. Ames sign negative. No other focal physical exam findings. Reviewed patient's records. She has a history of GERD. She was seen 2 months ago by her PCP and treated for GERD. Patient endorses relief of symptoms with the PPI. She states her symptoms today are more mild than that episode. At this point, we will obtain laboratory work, EKG, and urinalysis. Patient will be given Pepcid, GI cocktail, and IV fluids. We will reassess. 2:31 AM  CBC, CMP, lipase are WNL. EKG normal. Urine pregnancy positive. Quant hCG pending. Patient states she feels better after Pepcid, GI cocktail, and IV fluids. She is resting comfortably on hospital bed. Due to syncopal episode, we will rule out ectopic at this time with ultrasound. Patient is Rh+ per records. Patient states this is her ninth pregnancy. Quantitative hCG 213,117. Ultrasound reveals live IUP at approximately 8 weeks 5 days gestation. Patient continues to rest comfortably in hospital bed. I suspect patient is experiencing GERD, which she has been treated for the past. She states this episode is similar to those previous episodes. Patient noted improvement of symptoms with GI cocktail and Pepcid.  No suspicion for cardiac processes at this time. No suspicion for gastritis, hepatitis, cholecystitis, or perforation. Provided patient with a prescription for Pepcid. Instructed patient to follow-up with her OB/GYN, as well as her PCP. Patient should return to the ED with new or worsening symptoms. Patient understands and agrees to the plan. All questions were addressed. Patient discharged in stable condition. Clinical Impression     ED Diagnosis   1. Epigastric pain     2. Incidental pregnancy         Disposition        Discharge 11/18/2018  5:10 AM  Neri Benton discharge to home/self care.          Michelle Bernabe PA-C  11/18/18 0599

## 2022-03-31 NOTE — PROGRESS NOTE ADULT - ASSESSMENT
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    Plan:  -continue with NGT to LWS  -flush NGT q4h to ensure patency  -GI on board, for repeat EUS tomorrow for stent placement  -palliative on board  -medical management as per primary team  -surgical oncology will follow    Plan discussed with Dr. Dial

## 2022-03-31 NOTE — CHART NOTE - NSCHARTNOTEFT_GEN_A_CORE
Clinical Nutrition PN Follow Up Note  87 y.o. male with large obstructive retroperitoneal mass s/p unsuccessful EUS attempt due to near complete stomach obstruction with decreased po intake and 30lb weight loss now admitted for IR bx in am and possible stent placement.    03/30: Labs reviewed and K and Po4 are low (will add Potassium Phosphate additional 10 mEq). Pt's Mg corrected will hold from bag, Tbilli is stil elevated (no trace elements), Waiting on triglyceride to add lipids.     *labs reviewed;  03-31    144  |  112<H>  |  40<H>  ----------------------------<  124<H>  3.4<L>   |  25  |  1.36<H>    Ca    8.6      31 Mar 2022 08:49  Phos  2.2     03-31  Mg     2.3     03-31    TPro  5.7<L>  /  Alb  2.3<L>  /  TBili  5.7<H>  /  DBili  x   /  AST  126<H>  /  ALT  141<H>  /  AlkPhos  1003<H>  03-31      BMI: BMI (kg/m2): 25.8 (03-28-22 @ 10:55)  HbA1c:   Glucose: POCT Blood Glucose.: 110 mg/dL (03-31-22 @ 04:50)    BP: 174/78 (03-31-22 @ 09:13) (124/62 - 180/71)  Lipid Panel: Date/Time: 03-30-22 @ 06:11  Cholesterol, Serum: --  Direct LDL: --  HDL Cholesterol, Serum: --  Total Cholesterol/HDL Ration Measurement: --  Triglycerides, Serum: 153    MEDICATIONS  (STANDING):  dextrose 5% + sodium chloride 0.9%. 1000 milliLiter(s) (80 mL/Hr) IV Continuous <Continuous>  pantoprazole  Injectable 40 milliGRAM(s) IV Push two times a day  Parenteral Nutrition - Adult 1 Each (83 mL/Hr) TPN Continuous <Continuous>  timolol 0.5% Solution 1 Drop(s) Both EYES two times a day    MEDICATIONS  (PRN):  acetaminophen  Suppository .. 650 milliGRAM(s) Rectal every 4 hours PRN Mild Pain (1 - 3)  hydrALAZINE Injectable 10 milliGRAM(s) IV Push every 8 hours PRN SBP>140  lidocaine 2% Viscous 5 milliLiter(s) Swish and Spit every 6 hours PRN Mouth Care  morphine  - Injectable 2 milliGRAM(s) IV Push every 4 hours PRN Moderate Pain (4 - 6)  morphine  - Injectable 2 milliGRAM(s) IV Push every 2 hours PRN Severe Pain (7 - 10)    I&O's Detail    30 Mar 2022 07:01  -  31 Mar 2022 07:00  --------------------------------------------------------  IN:    PPN (Peripheral Parenteral Nutrition): 747 mL  Total IN: 747 mL    OUT:    Nasogastric/Oral tube (mL): 450 mL    Voided (mL): 950 mL  Total OUT: 1400 mL    Total NET: -653 mL      * fluid status: BM (+) on .  pt on bowel regimen.    *PO/TF intake:  Poor PO intake for several months with 30lb weight loss     *Boubacar Score of 13; PU documented.  edema documented.    Calories 3925-0861  Protein: 108-144  Fluid: 4742-1087    Weight History:  Height (cm): 167.6 (03-28-22 @ 10:55), 167.6 (03-25-22 @ 13:09)  Weight (kg): 72.6 (03-28-22 @ 10:55), 72.6 (03-25-22 @ 13:09)  BMI (kg/m2): 25.8 (03-28-22 @ 10:55), 25.8 (03-25-22 @ 13:09)  BSA (m2): 1.82 (03-28-22 @ 10:55), 1.82 (03-25-22 @ 13:09)  IBW:  IBW %:    *will continue to monitor and adjust PN prn*    PPN Recommendations: via peripheral access    Total Volume: 2000ml  75 g  Amino Acids  100 g Dextrose  50 g Lipids 20%  113 mEq Sodium Chloride  15 mEq Sodium Acetate  20 mmol Sodium Phosphate  32 mEq Potassium Chloride  18 mEq Potassium Acetate  20 mmol Potassium Phosphate  0 mEq Calcium Gluconate  0 mEq Magnesium Sulfate  100 mg Thiamine  10 ml MVI  60 mcg Selenium  10 mcg Chromium  5 mcg Zinc    Total Calories       1090 kcal     (   Meets     50  %  of  Estimated Energy needs  and       74   %  Protein needs)(<900 osmolarity)    Additional Recommendations:    1) Daily weights  2) Strict I & O's  3) Daily lyte checks  4) Weekly triglycerides/LFT checks    Deng Tena; [phone] Clinical Nutrition PN Follow Up Note  87 y.o. male with large obstructive retroperitoneal mass s/p unsuccessful EUS attempt due to near complete stomach obstruction with decreased po intake and 30lb weight loss now admitted for IR bx in am and possible stent placement.    03/30: Labs reviewed and K and Po4 are low (will add Potassium Phosphate additional 10 mEq). Pt's Mg corrected will hold from bag, Tbilli is stil elevated (no trace elements), Waiting on triglyceride to add lipids.     *labs reviewed;  03-31    144  |  112<H>  |  40<H>  ----------------------------<  124<H>  3.4<L>   |  25  |  1.36<H>    Ca    8.6      31 Mar 2022 08:49  Phos  2.2     03-31  Mg     2.3     03-31    TPro  5.7<L>  /  Alb  2.3<L>  /  TBili  5.7<H>  /  DBili  x   /  AST  126<H>  /  ALT  141<H>  /  AlkPhos  1003<H>  03-31      BMI: BMI (kg/m2): 25.8 (03-28-22 @ 10:55)  HbA1c:   Glucose: POCT Blood Glucose.: 110 mg/dL (03-31-22 @ 04:50)    BP: 174/78 (03-31-22 @ 09:13) (124/62 - 180/71)  Lipid Panel: Date/Time: 03-30-22 @ 06:11  Cholesterol, Serum: --  Direct LDL: --  HDL Cholesterol, Serum: --  Total Cholesterol/HDL Ration Measurement: --  Triglycerides, Serum: 153    MEDICATIONS  (STANDING):  dextrose 5% + sodium chloride 0.9%. 1000 milliLiter(s) (80 mL/Hr) IV Continuous <Continuous>  pantoprazole  Injectable 40 milliGRAM(s) IV Push two times a day  Parenteral Nutrition - Adult 1 Each (83 mL/Hr) TPN Continuous <Continuous>  timolol 0.5% Solution 1 Drop(s) Both EYES two times a day    MEDICATIONS  (PRN):  acetaminophen  Suppository .. 650 milliGRAM(s) Rectal every 4 hours PRN Mild Pain (1 - 3)  hydrALAZINE Injectable 10 milliGRAM(s) IV Push every 8 hours PRN SBP>140  lidocaine 2% Viscous 5 milliLiter(s) Swish and Spit every 6 hours PRN Mouth Care  morphine  - Injectable 2 milliGRAM(s) IV Push every 4 hours PRN Moderate Pain (4 - 6)  morphine  - Injectable 2 milliGRAM(s) IV Push every 2 hours PRN Severe Pain (7 - 10)    I&O's Detail    30 Mar 2022 07:01  -  31 Mar 2022 07:00  --------------------------------------------------------  IN:    PPN (Peripheral Parenteral Nutrition): 747 mL  Total IN: 747 mL    OUT:    Nasogastric/Oral tube (mL): 450 mL    Voided (mL): 950 mL  Total OUT: 1400 mL    Total NET: -653 mL      * fluid status: BM (+) on .  pt on bowel regimen.    *PO/TF intake:  Poor PO intake for several months with 30lb weight loss     *Boubacar Score of 13; PU documented.  edema documented.    Calories 2897-8478  Protein: 108-144  Fluid: 0229-1237    Weight History:  Height (cm): 167.6 (03-28-22 @ 10:55), 167.6 (03-25-22 @ 13:09)  Weight (kg): 72.6 (03-28-22 @ 10:55), 72.6 (03-25-22 @ 13:09)  BMI (kg/m2): 25.8 (03-28-22 @ 10:55), 25.8 (03-25-22 @ 13:09)  BSA (m2): 1.82 (03-28-22 @ 10:55), 1.82 (03-25-22 @ 13:09)  IBW:  IBW %:    *will continue to monitor and adjust PN prn*    PPN Recommendations: via peripheral access    Total Volume: 2000ml  75 g  Amino Acids  100 g Dextrose  50 g Lipids 20%  113 mEq Sodium Chloride  15 mEq Sodium Acetate  20 mmol Sodium Phosphate  32 mEq Potassium Chloride  18 mEq Potassium Acetate  20 mmol Potassium Phosphate  0 mEq Calcium Gluconate  0 mEq Magnesium Sulfate  100 mg Thiamine  10 ml MVI  60 mcg Selenium  10 mcg Chromium  5 mg Zinc    Total Calories       1090 kcal     (   Meets     50  %  of  Estimated Energy needs  and       74   %  Protein needs)(<900 osmolarity)    Additional Recommendations:    1) Daily weights  2) Strict I & O's  3) Daily lyte checks  4) Weekly triglycerides/LFT checks    Deng Tena; [phone]

## 2022-03-31 NOTE — PROGRESS NOTE ADULT - ASSESSMENT
Process of Care  --Reviewed dx/treatment problems and alignment with Goals of Care    Physical Aspects of Care  --Pain  patient denies at this time  c/w current managment    --Bowel Regimen  dulcolax ND T39pmiat hold for BM    --Dyspnea  No SOB at this time  comfortable and in NAD    --Nausea Vomiting  denies    --Weakness  PT as tolerated     --Delirium ppx  -Initiate melatonin 5mg qhs, to promote maintenance of circadian rhythm/restful sleep, and for ppx of future susceptibility to delirium upon resolution of presenting condition.  -Avoid deliriogenic agents including BZD, anticholinergics, and sedating agents if possible  -Re-orient frequently, encourage family at bedside as able.   -Early mobilization recommended if feasible.   Psychological and Psychiatric Aspects of Care:   Grief Bereavement emotional support provided  --Hx of psychiatric dx: none  -Pastoral Care Available PRN     Social Aspects of Care  -SW involved     Cultural Aspects  -Primary Language: English    Goals of Care:     We discussed Palliative Care team being a supportive team when a patient has ongoing illnesses.  We also discussed that it is not an end of life care service, but can help navigate symptoms and emotional support througout their hospital stay here.    Hospice was explained as well  as an end of life care philosophy.  When a disease cannot be cured, or family/patient decide the treatment burdens out weigh the risk and one choses to change focus of treatment from cure to quality/comfort.     Please refer to [facility] for further details    3/31  pt remains comfortable  awaiting procedure  tired but in NAD  no pain or nausea at this time  no changes in current goals of care    Prognosis: Death can occur at anytime, but if disease continues to progress naturally patient likely has days to weeks.    Ethical and Legal Aspects:   NA        Capacity: Full capacity for complex decision making  HCP/Surrogate: Anahy (daughter) and Trell (son)  Code Status: DNR DNI NO FEEDING TUBE  MOLST: ON CHART  Dispo Plan: DC planning pending stenting likely hospice    Discussed With: Case coordinated with attending and SW and RN     Time Spent: 45 minutes including the care, coordination and counseling of this patient, 50% of which was spent coordinating and counseling.

## 2022-03-31 NOTE — PROGRESS NOTE ADULT - SUBJECTIVE AND OBJECTIVE BOX
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   Had NGT placed with significant outpt.     3/30/22  pt seen and examined  NGT in place  pt in NAD no nausea vomiting sob   frail appearing, reports generalized weakness    3/31  pt seen and examined, in NAD  awaiting procedure  no pain or nausea or vomiting    PAIN: ( )Yes   (x )No   DYSPNEA: ( ) Yes  (x ) No      Review of Systems:    Anxiety- denies   Depression-denies   Physical Discomfort-denies   Dyspnea-denies    Constipation- has had issues on and off w/ constipation for >1 year  Diarrhea- denies   Nausea- denies   Vomiting- denies   Anorexia- ++   Weight Loss- ++  Cough- denies  Secretions- denies  Fatigue- ++  Weakness-  + weakness  Delirium- none    All other systems reviewed and negative       PHYSICAL EXAM:    Vital Signs Last 24 Hrs  T(C): 36.1 (31 Mar 2022 09:13), Max: 36.8 (30 Mar 2022 21:30)  T(F): 96.9 (31 Mar 2022 09:13), Max: 98.3 (30 Mar 2022 21:30)  HR: 68 (31 Mar 2022 12:46) (63 - 73)  BP: 147/68 (31 Mar 2022 12:46) (138/57 - 174/78)  BP(mean): --  RR: 18 (31 Mar 2022 09:13) (16 - 18)  SpO2: 95% (31 Mar 2022 09:13) (95% - 98%)  Daily     Daily       PPSV2: 40   %  FAST:    General: cachectic , weak, frial  Mental Status: oriented x 3 w. capacity  HEENT: eomi  Lungs: ctabl   Cardiac: s1s2 no mgr  GI: soft nontender+ BS NGT in place  : voids  Ext: moves all 4 ext.     Neuro: no gross findings      LABS:                        12.0   10.16 )-----------( 326      ( 30 Mar 2022 06:11 )             36.2     03-31    144  |  112<H>  |  40<H>  ----------------------------<  124<H>  3.4<L>   |  25  |  1.36<H>    Ca    8.6      31 Mar 2022 08:49  Phos  2.2     03-31  Mg     2.3     03-31    TPro  5.7<L>  /  Alb  2.3<L>  /  TBili  5.7<H>  /  DBili  x   /  AST  126<H>  /  ALT  141<H>  /  AlkPhos  1003<H>  03-31      Albumin: Albumin, Serum: 2.3 g/dL (03-31 @ 08:49)      Allergies    adhesives (Rash)  No Known Drug Allergies    Intolerances      MEDICATIONS  (STANDING):  enoxaparin Injectable 40 milliGRAM(s) SubCutaneous every 24 hours  fat emulsion (Fish Oil and Plant Based) 20% Infusion 0.6887 Gm/kG/Day (20.8 mL/Hr) IV Continuous <Continuous>  pantoprazole  Injectable 40 milliGRAM(s) IV Push two times a day  Parenteral Nutrition - Adult 1 Each (83 mL/Hr) TPN Continuous <Continuous>  Parenteral Nutrition - Adult 1 Each (83 mL/Hr) TPN Continuous <Continuous>  timolol 0.5% Solution 1 Drop(s) Both EYES two times a day    MEDICATIONS  (PRN):  acetaminophen  Suppository .. 650 milliGRAM(s) Rectal every 4 hours PRN Mild Pain (1 - 3)  hydrALAZINE Injectable 10 milliGRAM(s) IV Push every 6 hours PRN sbp>140  lidocaine 2% Viscous 5 milliLiter(s) Swish and Spit every 6 hours PRN Mouth Care  morphine  - Injectable 2 milliGRAM(s) IV Push every 4 hours PRN Moderate Pain (4 - 6)  morphine  - Injectable 2 milliGRAM(s) IV Push every 2 hours PRN Severe Pain (7 - 10)      RADIOLOGY:

## 2022-03-31 NOTE — PROGRESS NOTE ADULT - SUBJECTIVE AND OBJECTIVE BOX
C/C: Decreased po intake.     HPI:  87M, pmh of HTN, HLD, Hiatal hernia,  right ureteral stricture s/p stent and exchange who was found to have a large Right sided RP mass (most recently 4.4X2.8X11cm) and with poor po intake, 30 pound weight loss,   He had undergone an unsuccessful EUS attempt due to near complete obstruction 3/28 and was referred for admission.   Had NGT placed with significant outpt.     pt seen and examined this am. Felt ok. no pain. Was able to work with physical therapy yesterday. No sob/chest pain.     Review of system- All 10 systems reviewed and is as per HPI otherwise negative.     Vital Signs Last 24 Hrs  T(C): 36.1 (31 Mar 2022 09:13), Max: 36.8 (30 Mar 2022 21:30)  T(F): 96.9 (31 Mar 2022 09:13), Max: 98.3 (30 Mar 2022 21:30)  HR: 73 (31 Mar 2022 09:13) (63 - 73)  BP: 174/78 (31 Mar 2022 09:13) (138/57 - 174/78)  RR: 18 (31 Mar 2022 09:13) (16 - 18)  SpO2: 95% (31 Mar 2022 09:13) (95% - 99%)    PHYSICAL EXAM:    GENERAL: Comfortable, no acute distress, jaundiced, cachectic.   HEAD:  Atraumatic, Normocephalic  EYES: EOMI, PERRLA, +Scleral icterus  HEENT: dry  mucous membranes, NGT+  NECK: Supple, No JVD  NERVOUS SYSTEM:  Alert & Oriented X3, Motor Strength 5/5 B/L upper and lower extremities  CHEST/LUNG: Clear to auscultation bilaterally  HEART: Regular rate and rhythm; No murmurs, rubs, or gallops  ABDOMEN: Soft, Nontender, Nondistended; Bowel sounds present  GENITOURINARY- Voiding, no palpable bladder  EXTREMITIES:  No clubbing, cyanosis, or edema  MUSCULOSKELETAL- No muscle tenderness, No joint tenderness  SKIN-no rash    LABS:                        12.0   10.16 )-----------( 326      ( 30 Mar 2022 06:11 )             36.2     03-31    144  |  112<H>  |  40<H>  ----------------------------<  124<H>  3.4<L>   |  25  |  1.36<H>    Ca    8.6      31 Mar 2022 08:49  Phos  2.2     03-31  Mg     2.3     03-31    TPro  5.7<L>  /  Alb  2.3<L>  /  TBili  5.7<H>  /  DBili  x   /  AST  126<H>  /  ALT  141<H>  /  AlkPhos  1003<H>  03-31    MEDICATIONS  (STANDING):  enoxaparin Injectable 40 milliGRAM(s) SubCutaneous every 24 hours  fat emulsion (Fish Oil and Plant Based) 20% Infusion 0.6887 Gm/kG/Day (20.8 mL/Hr) IV Continuous <Continuous>  pantoprazole  Injectable 40 milliGRAM(s) IV Push two times a day  Parenteral Nutrition - Adult 1 Each (83 mL/Hr) TPN Continuous <Continuous>  Parenteral Nutrition - Adult 1 Each (83 mL/Hr) TPN Continuous <Continuous>  timolol 0.5% Solution 1 Drop(s) Both EYES two times a day    MEDICATIONS  (PRN):  acetaminophen  Suppository .. 650 milliGRAM(s) Rectal every 4 hours PRN Mild Pain (1 - 3)  hydrALAZINE Injectable 10 milliGRAM(s) IV Push every 6 hours PRN sbp>140  lidocaine 2% Viscous 5 milliLiter(s) Swish and Spit every 6 hours PRN Mouth Care  morphine  - Injectable 2 milliGRAM(s) IV Push every 4 hours PRN Moderate Pain (4 - 6)  morphine  - Injectable 2 milliGRAM(s) IV Push every 2 hours PRN Severe Pain (7 - 10)    ASSESSMENT AND PLAN:  87m, PMH as above a/w:    1. Duodenal obstruction d/t large RP mass/severe protein calorie malnutrition  -NGT to LWS  -IVF  -on PPN per GI/nutrition.  -plan is for EUS/biliary stent friday.      2. Elevated LFTs:  -d/t obstruction  -outpt ct showed intrahepatic biliary ductal dilation + CBD dilation to 1.9cm @ level of RP mass.   -biliary stent friday.     3. Renal failure/CKD III:  -likely partly obstructive  -had right ureteral stent placed prior to admission.   -also has stones left UV junction without hydro.     4. HTN:  -norvasc on hold while NPO  -prn hydralazine.     5. HLD:  -hold statin while npo.     6. Glaucoma:  -continue gtts.    7.  DVT px:  -lovenox.     dispo:  palliative eval appreciated.   pt is DNR/DNI and does not want it rescinded during procedure.   prognosis poor.   pt is aware.

## 2022-04-01 LAB
ALBUMIN SERPL ELPH-MCNC: 2.3 G/DL — LOW (ref 3.3–5)
ALP SERPL-CCNC: 969 U/L — HIGH (ref 40–120)
ALT FLD-CCNC: 150 U/L — HIGH (ref 12–78)
ANION GAP SERPL CALC-SCNC: 6 MMOL/L — SIGNIFICANT CHANGE UP (ref 5–17)
AST SERPL-CCNC: 144 U/L — HIGH (ref 15–37)
BILIRUB SERPL-MCNC: 6 MG/DL — HIGH (ref 0.2–1.2)
BUN SERPL-MCNC: 46 MG/DL — HIGH (ref 7–23)
CALCIUM SERPL-MCNC: 8.6 MG/DL — SIGNIFICANT CHANGE UP (ref 8.5–10.1)
CHLORIDE SERPL-SCNC: 111 MMOL/L — HIGH (ref 96–108)
CO2 SERPL-SCNC: 24 MMOL/L — SIGNIFICANT CHANGE UP (ref 22–31)
CREAT SERPL-MCNC: 1.35 MG/DL — HIGH (ref 0.5–1.3)
EGFR: 51 ML/MIN/1.73M2 — LOW
GLUCOSE SERPL-MCNC: 124 MG/DL — HIGH (ref 70–99)
MAGNESIUM SERPL-MCNC: 2.3 MG/DL — SIGNIFICANT CHANGE UP (ref 1.6–2.6)
PHOSPHATE SERPL-MCNC: 2.9 MG/DL — SIGNIFICANT CHANGE UP (ref 2.5–4.5)
POTASSIUM SERPL-MCNC: 3.8 MMOL/L — SIGNIFICANT CHANGE UP (ref 3.5–5.3)
POTASSIUM SERPL-SCNC: 3.8 MMOL/L — SIGNIFICANT CHANGE UP (ref 3.5–5.3)
PROT SERPL-MCNC: 5.8 GM/DL — LOW (ref 6–8.3)
SODIUM SERPL-SCNC: 141 MMOL/L — SIGNIFICANT CHANGE UP (ref 135–145)

## 2022-04-01 PROCEDURE — 43259 EGD US EXAM DUODENUM/JEJUNUM: CPT

## 2022-04-01 PROCEDURE — 43266 EGD ENDOSCOPIC STENT PLACE: CPT

## 2022-04-01 PROCEDURE — 99232 SBSQ HOSP IP/OBS MODERATE 35: CPT

## 2022-04-01 RX ORDER — OXYCODONE HYDROCHLORIDE 5 MG/1
10 TABLET ORAL ONCE
Refills: 0 | Status: DISCONTINUED | OUTPATIENT
Start: 2022-04-01 | End: 2022-04-01

## 2022-04-01 RX ORDER — I.V. FAT EMULSION 20 G/100ML
0.69 EMULSION INTRAVENOUS
Qty: 50 | Refills: 0 | Status: DISCONTINUED | OUTPATIENT
Start: 2022-04-01 | End: 2022-04-01

## 2022-04-01 RX ORDER — FENTANYL CITRATE 50 UG/ML
50 INJECTION INTRAVENOUS
Refills: 0 | Status: DISCONTINUED | OUTPATIENT
Start: 2022-04-01 | End: 2022-04-01

## 2022-04-01 RX ORDER — SODIUM CHLORIDE 9 MG/ML
1000 INJECTION, SOLUTION INTRAVENOUS
Refills: 0 | Status: DISCONTINUED | OUTPATIENT
Start: 2022-04-01 | End: 2022-04-01

## 2022-04-01 RX ORDER — ELECTROLYTE SOLUTION,INJ
1 VIAL (ML) INTRAVENOUS
Refills: 0 | Status: DISCONTINUED | OUTPATIENT
Start: 2022-04-01 | End: 2022-04-01

## 2022-04-01 RX ORDER — ONDANSETRON 8 MG/1
4 TABLET, FILM COATED ORAL ONCE
Refills: 0 | Status: DISCONTINUED | OUTPATIENT
Start: 2022-04-01 | End: 2022-04-01

## 2022-04-01 RX ORDER — HYDRALAZINE HCL 50 MG
5 TABLET ORAL ONCE
Refills: 0 | Status: COMPLETED | OUTPATIENT
Start: 2022-04-01 | End: 2022-04-01

## 2022-04-01 RX ADMIN — I.V. FAT EMULSION 20.8 GM/KG/DAY: 20 EMULSION INTRAVENOUS at 22:41

## 2022-04-01 RX ADMIN — PANTOPRAZOLE SODIUM 40 MILLIGRAM(S): 20 TABLET, DELAYED RELEASE ORAL at 09:11

## 2022-04-01 RX ADMIN — Medication 5 MILLIGRAM(S): at 16:57

## 2022-04-01 RX ADMIN — Medication 1 DROP(S): at 10:10

## 2022-04-01 RX ADMIN — PANTOPRAZOLE SODIUM 40 MILLIGRAM(S): 20 TABLET, DELAYED RELEASE ORAL at 21:41

## 2022-04-01 RX ADMIN — Medication 1 EACH: at 22:40

## 2022-04-01 RX ADMIN — Medication 1 DROP(S): at 21:40

## 2022-04-01 RX ADMIN — Medication 10 MILLIGRAM(S): at 21:53

## 2022-04-01 NOTE — BRIEF OPERATIVE NOTE - OPERATION/FINDINGS
- Severe duodenal obstruction from retroperitoneal mass with a large amount of food making fine needle biopsy technically impossible.    - Transnasal endoscopy with NG tube left in place.
Due to severely distorted anatomy, unable to perform standard ERCP, EUS guided/rendezvous ERCP, or EUS and biopsy.   Successful placement of a 22 mm x 120 mm duodenal stent.

## 2022-04-01 NOTE — BRIEF OPERATIVE NOTE - COMMENTS
Clear liquid diet today.   Full liquid diet tomorrow.  Then advance slowly as tolerated.   Will give patient a duodenal stent diet.

## 2022-04-01 NOTE — PROGRESS NOTE ADULT - ASSESSMENT
86 yo M with large retroperitoneal mass causing duodenal obstruction as well as compression on the portal system and right ureter.    Plan:  -continue with NGT to LWS  -flush NGT q4h to ensure patency  -GI on board, for repeat EUS today for stent placement  -palliative on board  -medical management as per primary team  -surgical oncology will follow    Plan discussed with Dr. Dial

## 2022-04-01 NOTE — CHART NOTE - NSCHARTNOTEFT_GEN_A_CORE
This SW met with pt. and his son at bedside. Pt. is waiting to go for procedure today. Pt. and his son discussed how they wish to have further conversation about the plan and options after the procedure. Pt. reports that regardless of this he knows that he cannot go home. Feelings explored and emotion support provided. Our team will follow up with pt. and his family to further determined goals of care and plan. Our team will continue to follow.

## 2022-04-01 NOTE — PROGRESS NOTE ADULT - SUBJECTIVE AND OBJECTIVE BOX
Patient was seen and evaluated at bedside. No acute events overnight. NGT remains in place. No bowel function. No f/c/cp/sob/n/v. VS reviewed.    Vital Signs Last 24 Hrs  T(C): 37.1 (01 Apr 2022 08:18), Max: 37.1 (31 Mar 2022 21:40)  T(F): 98.7 (01 Apr 2022 08:18), Max: 98.8 (31 Mar 2022 21:40)  HR: 67 (01 Apr 2022 08:18) (65 - 78)  BP: 166/69 (01 Apr 2022 08:18) (147/68 - 169/63)  BP(mean): 88 (31 Mar 2022 21:40) (88 - 94)  RR: 16 (01 Apr 2022 08:18) (16 - 16)  SpO2: 97% (01 Apr 2022 08:18) (97% - 97%)    Physical Exam:  General: AAOx3, cachetic, elderly male  HEENT: NC/AT, EOMI, NGT in place  Cardio: S1S2, RRR  Pulm: equal air entry b/l, non labored  Abdomen: soft, NT/ND    I&O's Detail    31 Mar 2022 07:01  -  01 Apr 2022 07:00  --------------------------------------------------------  IN:  Total IN: 0 mL    OUT:    Nasogastric/Oral tube (mL): 400 mL    Voided (mL): 1025 mL  Total OUT: 1425 mL    Total NET: -1425 mL      Vital Signs Last 24 Hrs  T(C): 37.1 (01 Apr 2022 08:18), Max: 37.1 (31 Mar 2022 21:40)  T(F): 98.7 (01 Apr 2022 08:18), Max: 98.8 (31 Mar 2022 21:40)  HR: 67 (01 Apr 2022 08:18) (65 - 78)  BP: 166/69 (01 Apr 2022 08:18) (147/68 - 169/63)  BP(mean): 88 (31 Mar 2022 21:40) (88 - 94)  RR: 16 (01 Apr 2022 08:18) (16 - 16)  SpO2: 97% (01 Apr 2022 08:18) (97% - 97%)        MEDICATIONS  (STANDING):  dextrose 5% + sodium chloride 0.9%. 1000 milliLiter(s) (80 mL/Hr) IV Continuous <Continuous>  enoxaparin Injectable 40 milliGRAM(s) SubCutaneous every 24 hours  pantoprazole  Injectable 40 milliGRAM(s) IV Push two times a day  Parenteral Nutrition - Adult 1 Each (83 mL/Hr) TPN Continuous <Continuous>  timolol 0.5% Solution 1 Drop(s) Both EYES two times a day    MEDICATIONS  (PRN):  acetaminophen  Suppository .. 650 milliGRAM(s) Rectal every 4 hours PRN Mild Pain (1 - 3)  hydrALAZINE Injectable 10 milliGRAM(s) IV Push every 8 hours PRN SBP>140  lidocaine 2% Viscous 5 milliLiter(s) Swish and Spit every 6 hours PRN Mouth Care  morphine  - Injectable 2 milliGRAM(s) IV Push every 4 hours PRN Moderate Pain (4 - 6)  morphine  - Injectable 2 milliGRAM(s) IV Push every 2 hours PRN Severe Pain (7 - 10)      LABS:                                RADIOLOGY & ADDITIONAL STUDIES:

## 2022-04-01 NOTE — CHART NOTE - NSCHARTNOTEFT_GEN_A_CORE
Clinical Nutrition PN Follow Up Note  87 y.o. male with large obstructive retroperitoneal mass s/p unsuccessful EUS attempt due to near complete stomach obstruction with decreased po intake and 30lb weight loss now admitted for IR bx in am and possible stent placement.    04/01: Pt noted for stent placement today. Pt states wishes of DNR/DNI and No long term feeding tube. Pt seen by palliative and noted that procedure is for palliative purposes. Given pt's wishes it warrants considering holding PPN as PPN is not a long term solution for nutritional status. RD recommends c/w PPN until after procedure. If patient can tolerate any PO than d/c PPN and allow patient to eat for comfort. RD will continue to monitor and assist with patients care and nutritional status      Labs Reviewed: Pt's      *labs reviewed;  04-01    141  |  111<H>  |  46<H>  ----------------------------<  124<H>  3.8   |  24  |  1.35<H>    Ca    8.6      01 Apr 2022 06:38  Phos  2.9     04-01  Mg     2.3     04-01    TPro  5.8<L>  /  Alb  2.3<L>  /  TBili  6.0<H>  /  DBili  x   /  AST  144<H>  /  ALT  150<H>  /  AlkPhos  969<H>  04-01      BMI: BMI (kg/m2): 25.8 (03-28-22 @ 10:55)  HbA1c:   Glucose: POCT Blood Glucose.: 112 mg/dL (04-01-22 @ 02:09)    BP: 166/69 (04-01-22 @ 08:18) (138/57 - 180/71)  Lipid Panel: Date/Time: 03-30-22 @ 06:11  Cholesterol, Serum: --  Direct LDL: --  HDL Cholesterol, Serum: --  Total Cholesterol/HDL Ration Measurement: --  Triglycerides, Serum: 153      MEDICATIONS  (STANDING):  dextrose 5% + sodium chloride 0.9%. 1000 milliLiter(s) (80 mL/Hr) IV Continuous <Continuous>  pantoprazole  Injectable 40 milliGRAM(s) IV Push two times a day  Parenteral Nutrition - Adult 1 Each (83 mL/Hr) TPN Continuous <Continuous>  timolol 0.5% Solution 1 Drop(s) Both EYES two times a day    MEDICATIONS  (PRN):  acetaminophen  Suppository .. 650 milliGRAM(s) Rectal every 4 hours PRN Mild Pain (1 - 3)  hydrALAZINE Injectable 10 milliGRAM(s) IV Push every 8 hours PRN SBP>140  lidocaine 2% Viscous 5 milliLiter(s) Swish and Spit every 6 hours PRN Mouth Care  morphine  - Injectable 2 milliGRAM(s) IV Push every 4 hours PRN Moderate Pain (4 - 6)  morphine  - Injectable 2 milliGRAM(s) IV Push every 2 hours PRN Severe Pain (7 - 10)    I&O's Detail    31 Mar 2022 07:01  -  01 Apr 2022 07:00  --------------------------------------------------------  IN:  Total IN: 0 mL    OUT:    Nasogastric/Oral tube (mL): 400 mL    Voided (mL): 1025 mL  Total OUT: 1425 mL    Total NET: -1425 mL    * fluid status is incomplete (not recording intake): BM (+) on 03/24.  Pt has NGT with output    *PO/TF intake:  Poor PO intake for several months with 30lb weight loss     *Boubacar Score of 13; PU documented.  edema documented.    Wt used 72.6kg  Calories 0672-0727 (30-35 kg/kcal)  Protein: 108-144  (1.5-2.0 g/kg)  Fluid: 7452-6607  (25-30 cc/kg)    Weight History:  Height (cm): 167.6 (03-28-22 @ 10:55), 167.6 (03-25-22 @ 13:09)  Weight (kg): 72.6 (03-28-22 @ 10:55), 72.6 (03-25-22 @ 13:09)  BMI (kg/m2): 25.8 (03-28-22 @ 10:55), 25.8 (03-25-22 @ 13:09)  BSA (m2): 1.82 (03-28-22 @ 10:55), 1.82 (03-25-22 @ 13:09)  IBW:  IBW %:    *will continue to monitor and adjust PN prn*    PPN Recommendations: via peripheral access    Total Volume: 2000ml  80 g  Amino Acids  100 g Dextrose  50 g Lipids 20%  113 mEq Sodium Chloride  15 mEq Sodium Acetate  20 mmol Sodium Phosphate  37 mEq Potassium Chloride  15 mEq Potassium Acetate  20 mmol Potassium Phosphate  0 mEq Calcium Gluconate  0 mEq Magnesium Sulfate  100 mg Thiamine  10 ml MVI  60 mcg Selenium  10 mcg Chromium  5 mg Zinc    Total Calories       1110 kcal     (   Meets     51%  of  Estimated Energy needs  and  74% Protein needs)(<900 osmolarity)    Additional Recommendations:    1) Daily weights  2) Strict I & O's  3) Daily lyte checks  4) Weekly triglycerides/LFT checks  5) Monitor Glucose POC Q6  6) Pt's wishes established regarding nutrition GOC, Recommend D/cing PPN after procedure     Deng Tena; [phone]

## 2022-04-01 NOTE — PROGRESS NOTE ADULT - SUBJECTIVE AND OBJECTIVE BOX
C/C: Decreased po intake.     HPI:  87M, pmh of HTN, HLD, Hiatal hernia,  right ureteral stricture s/p stent and exchange who was found to have a large Right sided RP mass (most recently 4.4X2.8X11cm) and with poor po intake, 30 pound weight loss,   He had undergone an unsuccessful EUS attempt due to near complete obstruction 3/28 and was referred for admission.   Had NGT placed with significant outpt.     pt seen and examined earlier today. Was awaiting stent placements. No sob/chest pain. no difficulty voiding.    Review of system- All 10 systems reviewed and is as per HPI otherwise negative.     Vital Signs Last 24 Hrs  T(C): 37.1 (01 Apr 2022 08:18), Max: 37.1 (31 Mar 2022 21:40)  T(F): 98.7 (01 Apr 2022 08:18), Max: 98.8 (31 Mar 2022 21:40)  HR: 67 (01 Apr 2022 08:18) (65 - 78)  BP: 166/69 (01 Apr 2022 08:18) (154/70 - 169/63)  BP(mean): 88 (31 Mar 2022 21:40) (88 - 94)  RR: 16 (01 Apr 2022 08:18) (16 - 16)  SpO2: 97% (01 Apr 2022 08:18) (97% - 97%)    PHYSICAL EXAM:    GENERAL: Comfortable, no acute distress, jaundiced, cachectic.   HEAD:  Atraumatic, Normocephalic  EYES: EOMI, PERRLA, +Scleral icterus  HEENT: dry  mucous membranes, NGT+  NECK: Supple, No JVD  NERVOUS SYSTEM:  Alert & Oriented X3, Motor Strength 5/5 B/L upper and lower extremities  CHEST/LUNG: Clear to auscultation bilaterally  HEART: Regular rate and rhythm; No murmurs, rubs, or gallops  ABDOMEN: Soft, Nontender, Nondistended; Bowel sounds present  GENITOURINARY- Voiding, no palpable bladder  EXTREMITIES:  No clubbing, cyanosis, or edema  MUSCULOSKELETAL- No muscle tenderness, No joint tenderness  SKIN-no rash  LABS:    04-01    141  |  111<H>  |  46<H>  ----------------------------<  124<H>  3.8   |  24  |  1.35<H>    Ca    8.6      01 Apr 2022 06:38  Phos  2.9     04-01  Mg     2.3     04-01    TPro  5.8<L>  /  Alb  2.3<L>  /  TBili  6.0<H>  /  DBili  x   /  AST  144<H>  /  ALT  150<H>  /  AlkPhos  969<H>  04-01      MEDICATIONS  (STANDING):  enoxaparin Injectable 40 milliGRAM(s) SubCutaneous every 24 hours  fat emulsion (Fish Oil and Plant Based) 20% Infusion 0.6887 Gm/kG/Day (20.8 mL/Hr) IV Continuous <Continuous>  fat emulsion (Fish Oil and Plant Based) 20% Infusion 0.6887 Gm/kG/Day (20.8 mL/Hr) IV Continuous <Continuous>  pantoprazole  Injectable 40 milliGRAM(s) IV Push two times a day  Parenteral Nutrition - Adult 1 Each (83 mL/Hr) TPN Continuous <Continuous>  Parenteral Nutrition - Adult 1 Each (83 mL/Hr) TPN Continuous <Continuous>  timolol 0.5% Solution 1 Drop(s) Both EYES two times a day    MEDICATIONS  (PRN):  acetaminophen  Suppository .. 650 milliGRAM(s) Rectal every 4 hours PRN Mild Pain (1 - 3)  hydrALAZINE Injectable 10 milliGRAM(s) IV Push every 6 hours PRN sbp>140  lidocaine 2% Viscous 5 milliLiter(s) Swish and Spit every 6 hours PRN Mouth Care  morphine  - Injectable 2 milliGRAM(s) IV Push every 4 hours PRN Moderate Pain (4 - 6)  morphine  - Injectable 2 milliGRAM(s) IV Push every 2 hours PRN Severe Pain (7 - 10)  ASSESSMENT AND PLAN:  87m, PMH as above a/w:    1. Duodenal obstruction d/t large RP mass/severe protein calorie malnutrition  -NGT to LWS  -IVF  -on PPN per GI/nutrition.  -plan is for EUS/biliary stent today    2. Elevated LFTs:  -d/t obstruction  -outpt ct showed intrahepatic biliary ductal dilation + CBD dilation to 1.9cm @ level of RP mass.   -biliary stent friday.     3. Renal failure/CKD III:  -likely partly obstructive  -had right ureteral stent placed prior to admission.   -also has stones left UV junction without hydro.     4. HTN:  -norvasc on hold while NPO  -prn hydralazine.     5. HLD:  -hold statin while npo.     6. Glaucoma:  -continue gtts.    7.  DVT px:  -lovenox.     dispo:  palliative eval appreciated.   pt is DNR/DNI and does not want it rescinded during procedure.   prognosis poor.   pt is aware.   further discussions re: dc planning needed post stent placement.

## 2022-04-02 PROCEDURE — 99232 SBSQ HOSP IP/OBS MODERATE 35: CPT

## 2022-04-02 RX ORDER — FAMOTIDINE 10 MG/ML
20 INJECTION INTRAVENOUS ONCE
Refills: 0 | Status: COMPLETED | OUTPATIENT
Start: 2022-04-02 | End: 2022-04-02

## 2022-04-02 RX ORDER — AMLODIPINE BESYLATE 2.5 MG/1
10 TABLET ORAL DAILY
Refills: 0 | Status: DISCONTINUED | OUTPATIENT
Start: 2022-04-02 | End: 2022-04-05

## 2022-04-02 RX ADMIN — PANTOPRAZOLE SODIUM 40 MILLIGRAM(S): 20 TABLET, DELAYED RELEASE ORAL at 21:26

## 2022-04-02 RX ADMIN — Medication 10 MILLIGRAM(S): at 12:39

## 2022-04-02 RX ADMIN — FAMOTIDINE 20 MILLIGRAM(S): 10 INJECTION INTRAVENOUS at 15:43

## 2022-04-02 RX ADMIN — Medication 10 MILLIGRAM(S): at 21:26

## 2022-04-02 RX ADMIN — AMLODIPINE BESYLATE 10 MILLIGRAM(S): 2.5 TABLET ORAL at 10:16

## 2022-04-02 RX ADMIN — Medication 1 DROP(S): at 10:15

## 2022-04-02 RX ADMIN — PANTOPRAZOLE SODIUM 40 MILLIGRAM(S): 20 TABLET, DELAYED RELEASE ORAL at 10:16

## 2022-04-02 RX ADMIN — ENOXAPARIN SODIUM 40 MILLIGRAM(S): 100 INJECTION SUBCUTANEOUS at 13:38

## 2022-04-02 RX ADMIN — Medication 1 DROP(S): at 21:36

## 2022-04-02 NOTE — CHART NOTE - NSCHARTNOTEFT_GEN_A_CORE
Dietitian Brief Note    Pt tolerated clear liquid diet. NGT removed. S/p duodenal stent placement. Advanced to full liquid diet. Will d/c PPN.     Recommendations:  1) Add ensure enlive TID to optimize PO intake   2) Encourage protein-rich foods, maximize food preferences   3) MVI w/ minerals daily to ensure 100% RDA met   4) Monitor bowel movements, if no BM for >3 days, consider implementing bowel regimen.   5) Consider adding thiamine 100 mg daily 2/2 poor PO intake/ malnutrition     RD will continue to monitor PO intake, labs, hydration, and wt prn.   Kinga Ribera, MS, RDN, [phone]

## 2022-04-02 NOTE — PROGRESS NOTE ADULT - SUBJECTIVE AND OBJECTIVE BOX
Patient is a 87y old  Male who presents with a chief complaint of s/p unsuccessful EUS (01 Apr 2022 15:01)      HPI:  pt is s/p duodenal stent   his anatomy precluded biliary drainage    PAST MEDICAL & SURGICAL HISTORY:  Hypercholesteremia    Hypertension    COVID-19 vaccine series completed  [employer]- march 2021    Ureteral stricture, right    Arthritis    Abnormal finding on GI tract imaging    Frequent UTI    Hiatal hernia    Retroperitoneal mass  Right    S/P hip replacement, right  2010    History of bilateral knee replacement  2006, 2008    History of transurethral prostatectomy  2010    History of cystoscopy  Right Ureteroscopy, stent insertion ---9/2021    History of strabismus surgery  Bilateral as a child    Bilateral cataracts    S/P ureteral stent placement  and exchange 1/ 2022        MEDICATIONS  (STANDING):  enoxaparin Injectable 40 milliGRAM(s) SubCutaneous every 24 hours  fat emulsion (Fish Oil and Plant Based) 20% Infusion 0.6887 Gm/kG/Day (20.8 mL/Hr) IV Continuous <Continuous>  pantoprazole  Injectable 40 milliGRAM(s) IV Push two times a day  Parenteral Nutrition - Adult 1 Each (83 mL/Hr) TPN Continuous <Continuous>  timolol 0.5% Solution 1 Drop(s) Both EYES two times a day    MEDICATIONS  (PRN):  acetaminophen  Suppository .. 650 milliGRAM(s) Rectal every 4 hours PRN Mild Pain (1 - 3)  hydrALAZINE Injectable 10 milliGRAM(s) IV Push every 6 hours PRN sbp>140  lidocaine 2% Viscous 5 milliLiter(s) Swish and Spit every 6 hours PRN Mouth Care  morphine  - Injectable 2 milliGRAM(s) IV Push every 4 hours PRN Moderate Pain (4 - 6)  morphine  - Injectable 2 milliGRAM(s) IV Push every 2 hours PRN Severe Pain (7 - 10)      Allergies    adhesives (Rash)  No Known Drug Allergies    Intolerances        REVIEW OF SYSTEMS:    CONSTITUTIONAL: weakness  RESPIRATORY: No cough, wheezing, hemoptysis; No shortness of breath  CARDIOVASCULAR: No chest pain or palpitations  GASTROINTESTINAL: tolerating clears  All other review of systems is negative unless indicated above.    Vital Signs Last 24 Hrs  T(C): 36.3 (01 Apr 2022 21:43), Max: 37.1 (01 Apr 2022 08:18)  T(F): 97.4 (01 Apr 2022 21:43), Max: 98.7 (01 Apr 2022 08:18)  HR: 77 (01 Apr 2022 21:43) (64 - 77)  BP: 165/83 (01 Apr 2022 21:43) (154/70 - 174/63)  BP(mean): 105 (01 Apr 2022 21:43) (105 - 105)  RR: 18 (01 Apr 2022 21:43) (14 - 18)  SpO2: 96% (01 Apr 2022 21:43) (95% - 98%)    PHYSICAL EXAM:  Constitutional: NAD, thin male  Gastrointestinal: BS+, soft, NT/ND    LABS:    04-01    141  |  111<H>  |  46<H>  ----------------------------<  124<H>  3.8   |  24  |  1.35<H>    Ca    8.6      01 Apr 2022 06:38  Phos  2.9     04-01  Mg     2.3     04-01    TPro  5.8<L>  /  Alb  2.3<L>  /  TBili  6.0<H>  /  DBili  x   /  AST  144<H>  /  ALT  150<H>  /  AlkPhos  969<H>  04-01      LIVER FUNCTIONS - ( 01 Apr 2022 06:38 )  Alb: 2.3 g/dL / Pro: 5.8 gm/dL / ALK PHOS: 969 U/L / ALT: 150 U/L / AST: 144 U/L / GGT: x             RADIOLOGY & ADDITIONAL STUDIES:

## 2022-04-02 NOTE — PROGRESS NOTE ADULT - SUBJECTIVE AND OBJECTIVE BOX
Patient was seen and evaluated at bedside. No acute events overnight. NGT removed yesterday. Duodenal stent placed by GI yesterday. +flatus. No f/c/cp/sob/n/v. VS reviewed.    Vital Signs Last 24 Hrs  T(C): 36.3 (01 Apr 2022 21:43), Max: 36.9 (01 Apr 2022 17:39)  T(F): 97.4 (01 Apr 2022 21:43), Max: 98.4 (01 Apr 2022 17:39)  HR: 77 (01 Apr 2022 21:43) (64 - 77)  BP: 165/83 (01 Apr 2022 21:43) (154/70 - 174/63)  BP(mean): 105 (01 Apr 2022 21:43) (105 - 105)  RR: 18 (01 Apr 2022 21:43) (14 - 18)  SpO2: 96% (01 Apr 2022 21:43) (95% - 98%)  Labs:        04-01    141  |  111<H>  |  46<H>  ----------------------------<  124<H>  3.8   |  24  |  1.35<H>    Ca    8.6      01 Apr 2022 06:38  Phos  2.9     04-01  Mg     2.3     04-01    TPro  5.8<L>  /  Alb  2.3<L>  /  TBili  6.0<H>  /  DBili  x   /  AST  144<H>  /  ALT  150<H>  /  AlkPhos  969<H>  04-01    LIVER FUNCTIONS - ( 01 Apr 2022 06:38 )  Alb: 2.3 g/dL / Pro: 5.8 gm/dL / ALK PHOS: 969 U/L / ALT: 150 U/L / AST: 144 U/L / GGT: x               Physical Exam:  General: AAOx3, cachetic, elderly male  HEENT: NC/AT, EOMI, NGT out  Cardio: S1S2, RRR  Pulm: equal air entry b/l, non labored  Abdomen: soft, NT/ND

## 2022-04-02 NOTE — PROGRESS NOTE ADULT - ASSESSMENT
88yo male with RP mass causing biliary duodenal obstruction    s/p duodenal stent  advance to full liquid diet  if tolerates, then will advance to stent diet with d/c planning

## 2022-04-02 NOTE — PROGRESS NOTE ADULT - SUBJECTIVE AND OBJECTIVE BOX
C/C: Decreased po intake.     HPI: 87M pmh of HTN, HLD, Hiatal hernia, right ureteral stricture s/p stent and exchange who was found to have a large Right sided RP mass (most recently 4.4X2.8X11cm) and with poor po intake, 30 pound weight loss,   He had undergone an unsuccessful EUS attempt due to near complete obstruction 3/28 and was referred for admission.   Had NGT placed with significant outpt.   Now s/p duodenal stent placement.     pt seen and examined this am. Started on liquids. no abd pain. No sob/chest pain. wants to speak with social work re: dc options.     Review of system- All 10 systems reviewed and is as per HPI otherwise negative.     Vital Signs Last 24 Hrs  T(C): 36.9 (02 Apr 2022 10:14), Max: 36.9 (01 Apr 2022 17:39)  T(F): 98.5 (02 Apr 2022 10:14), Max: 98.5 (02 Apr 2022 10:14)  HR: 70 (02 Apr 2022 13:43) (64 - 77)  BP: 150/58 (02 Apr 2022 13:43) (150/58 - 174/63)  BP(mean): 105 (01 Apr 2022 21:43) (105 - 105)  RR: 18 (02 Apr 2022 10:14) (14 - 18)  SpO2: 97% (02 Apr 2022 10:14) (95% - 98%)    PHYSICAL EXAM:    GENERAL: Comfortable, no acute distress, jaundiced, cachectic.   HEAD:  Atraumatic, Normocephalic  EYES: EOMI, PERRLA, +Scleral icterus  HEENT: moist mm  NECK: Supple, No JVD  NERVOUS SYSTEM:  Alert & Oriented X3, Motor Strength 5/5 B/L upper and lower extremities  CHEST/LUNG: Clear to auscultation bilaterally  HEART: Regular rate and rhythm; No murmurs, rubs, or gallops  ABDOMEN: Soft, Nontender, Nondistended; Bowel sounds present  GENITOURINARY- Voiding, no palpable bladder  EXTREMITIES:  No clubbing, cyanosis, bilateral le edema+  MUSCULOSKELETAL- No muscle tenderness, No joint tenderness  SKIN-no rash    LABS:    04-01    141  |  111<H>  |  46<H>  ----------------------------<  124<H>  3.8   |  24  |  1.35<H>    Ca    8.6      01 Apr 2022 06:38  Phos  2.9     04-01  Mg     2.3     04-01    TPro  5.8<L>  /  Alb  2.3<L>  /  TBili  6.0<H>  /  DBili  x   /  AST  144<H>  /  ALT  150<H>  /  AlkPhos  969<H>  04-  MEDICATIONS  (STANDING):  amLODIPine   Tablet 10 milliGRAM(s) Oral daily  enoxaparin Injectable 40 milliGRAM(s) SubCutaneous every 24 hours  fat emulsion (Fish Oil and Plant Based) 20% Infusion 0.6887 Gm/kG/Day (20.8 mL/Hr) IV Continuous <Continuous>  pantoprazole  Injectable 40 milliGRAM(s) IV Push two times a day  Parenteral Nutrition - Adult 1 Each (83 mL/Hr) TPN Continuous <Continuous>  timolol 0.5% Solution 1 Drop(s) Both EYES two times a day    MEDICATIONS  (PRN):  acetaminophen  Suppository .. 650 milliGRAM(s) Rectal every 4 hours PRN Mild Pain (1 - 3)  hydrALAZINE Injectable 10 milliGRAM(s) IV Push every 6 hours PRN sbp>140  lidocaine 2% Viscous 5 milliLiter(s) Swish and Spit every 6 hours PRN Mouth Care  morphine  - Injectable 2 milliGRAM(s) IV Push every 4 hours PRN Moderate Pain (4 - 6)  morphine  - Injectable 2 milliGRAM(s) IV Push every 2 hours PRN Severe Pain (7 - 10)    ASSESSMENT AND PLAN:  87m, PMH as above a/w:    1. Duodenal obstruction d/t large RP mass/severe protein calorie malnutrition  -now s/p duodenal stent.   -clears adv to fulls per gi.   -PPI  -dc PPN  -physical therapy/ambulate.     2. Elevated LFTs:  -d/t obstruction  -outpt ct showed intrahepatic biliary ductal dilation + CBD dilation to 1.9cm @ level of RP mass.   -unable to place biliary stent d/t anatomy.     3. Renal failure/CKD III:  -likely partly obstructive  -had right ureteral stent placed prior to admission.   -also has stones left UV junction without hydro.     4. HTN:  -resume norvasc  -continue prn iv hydralazine.    5. HLD:  -off statin.     6. Glaucoma:  -continue gtts.    7.  DVT px:  -lovenox.     dispo:  palliative eval appreciated.   pt is DNR/DNI   prognosis poor.   palliative /sw f/u monday re: dc planning

## 2022-04-02 NOTE — PROGRESS NOTE ADULT - ASSESSMENT
88 yo M with large retroperitoneal mass causing duodenal obstruction as well as compression on the portal system and right ureter, s/p duodenal stent placement.    Plan:  -palliative on board  -advance diet as tolerated, FLD today  -medical management as per primary team  -surgical oncology will follow    Plan discussed with Dr. Dial 88 yo M with large retroperitoneal mass causing duodenal obstruction as well as compression on the portal system and right ureter, s/p duodenal stent placement, tolerating liquid diet.    Plan:  -palliative on board  -advance diet as tolerated, FLD today  -medical management as per primary team  -no surgical oncology intervention indicated at this time, please reconsult if necessary    Plan discussed with Dr. Dial

## 2022-04-03 PROCEDURE — 99232 SBSQ HOSP IP/OBS MODERATE 35: CPT

## 2022-04-03 RX ADMIN — AMLODIPINE BESYLATE 10 MILLIGRAM(S): 2.5 TABLET ORAL at 09:52

## 2022-04-03 RX ADMIN — Medication 1 DROP(S): at 22:20

## 2022-04-03 RX ADMIN — ENOXAPARIN SODIUM 40 MILLIGRAM(S): 100 INJECTION SUBCUTANEOUS at 12:51

## 2022-04-03 RX ADMIN — Medication 1 DROP(S): at 09:52

## 2022-04-03 RX ADMIN — PANTOPRAZOLE SODIUM 40 MILLIGRAM(S): 20 TABLET, DELAYED RELEASE ORAL at 09:51

## 2022-04-03 RX ADMIN — PANTOPRAZOLE SODIUM 40 MILLIGRAM(S): 20 TABLET, DELAYED RELEASE ORAL at 22:17

## 2022-04-03 NOTE — PROGRESS NOTE ADULT - SUBJECTIVE AND OBJECTIVE BOX
C/C: Decreased po intake.     HPI: 87M pmh of HTN, HLD, Hiatal hernia, right ureteral stricture s/p stent and exchange who was found to have a large Right sided RP mass (most recently 4.4X2.8X11cm) and with poor po intake, 30 pound weight loss,   He had undergone an unsuccessful EUS attempt due to near complete obstruction 3/28 and was referred for admission.   Had NGT placed with significant outpt.   Now s/p duodenal stent placement.     pt seen and examined this am. Doing well. Tolerating po. no sob/chest pain. Some intermittent abd gas pains. no bm yet.     Review of system- All 10 systems reviewed and is as per HPI otherwise negative.     Vital Signs Last 24 Hrs  T(C): 36.6 (03 Apr 2022 09:26), Max: 36.9 (02 Apr 2022 21:12)  T(F): 97.9 (03 Apr 2022 09:26), Max: 98.4 (02 Apr 2022 21:12)  HR: 83 (03 Apr 2022 09:26) (69 - 83)  BP: 151/74 (03 Apr 2022 09:26) (149/62 - 170/80)  BP(mean): 85 (02 Apr 2022 22:14) (85 - 85)  RR: 17 (03 Apr 2022 09:26) (17 - 18)  SpO2: 97% (03 Apr 2022 09:26) (97% - 98%)    PHYSICAL EXAM:    GENERAL: Comfortable, no acute distress, jaundiced, cachectic.   HEAD:  Atraumatic, Normocephalic  EYES: EOMI, PERRLA, +Scleral icterus  HEENT: moist mm  NECK: Supple, No JVD  NERVOUS SYSTEM:  Alert & Oriented X3, Motor Strength 5/5 B/L upper and lower extremities  CHEST/LUNG: Clear to auscultation bilaterally  HEART: Regular rate and rhythm; No murmurs, rubs, or gallops  ABDOMEN: Soft, Nontender, Nondistended; Bowel sounds present  GENITOURINARY- Voiding, no palpable bladder  EXTREMITIES:  No clubbing, cyanosis, bilateral le edema+  MUSCULOSKELETAL- No muscle tenderness, No joint tenderness  SKIN-no rash      LABS:none today              MEDICATIONS  (STANDING):  amLODIPine   Tablet 10 milliGRAM(s) Oral daily  enoxaparin Injectable 40 milliGRAM(s) SubCutaneous every 24 hours  fat emulsion (Fish Oil and Plant Based) 20% Infusion 0.6887 Gm/kG/Day (20.8 mL/Hr) IV Continuous <Continuous>  pantoprazole  Injectable 40 milliGRAM(s) IV Push two times a day  Parenteral Nutrition - Adult 1 Each (83 mL/Hr) TPN Continuous <Continuous>  timolol 0.5% Solution 1 Drop(s) Both EYES two times a day    MEDICATIONS  (PRN):  acetaminophen  Suppository .. 650 milliGRAM(s) Rectal every 4 hours PRN Mild Pain (1 - 3)  hydrALAZINE Injectable 10 milliGRAM(s) IV Push every 6 hours PRN sbp>140  lidocaine 2% Viscous 5 milliLiter(s) Swish and Spit every 6 hours PRN Mouth Care  morphine  - Injectable 2 milliGRAM(s) IV Push every 4 hours PRN Moderate Pain (4 - 6)  morphine  - Injectable 2 milliGRAM(s) IV Push every 2 hours PRN Severe Pain (7 - 10)    ASSESSMENT AND PLAN:  87m, PMH as above a/w:    1. Duodenal obstruction d/t large RP mass/severe protein calorie malnutrition  -now s/p duodenal stent.   -diet advanced to stent diet.   -PPI  -off ppn  -physical therapy/ambulate.     2. Elevated LFTs:  -d/t obstruction  -outpt ct showed intrahepatic biliary ductal dilation + CBD dilation to 1.9cm @ level of RP mass.   -unable to place biliary stent d/t anatomy.     3. Renal failure/CKD III:  -likely partly obstructive  -had right ureteral stent placed prior to admission.   -also has stones left UV junction without hydro.     4. HTN:  -resumed norvasc  -continue prn iv hydralazine.    5. HLD:  -off statin.     6. Glaucoma:  -continue gtts.    7.  DVT px:  -lovenox.     dispo:  pt is DNR/DNI   prognosis poor  pt/family aware.   leaning towards hospice.   does not want any treatment for cancer   palliative /sw f/u monday re: dc options

## 2022-04-03 NOTE — PROGRESS NOTE ADULT - SUBJECTIVE AND OBJECTIVE BOX
Patient is a 87y old  Male who presents with a chief complaint of s/p unsuccessful EUS (02 Apr 2022 15:55)    HPI:  pt tolerating liquids  he inquires about hospice planning  he is passing gas but no significant bm    PAST MEDICAL & SURGICAL HISTORY:  Hypercholesteremia    Hypertension    COVID-19 vaccine series completed  moderna- march 2021    Ureteral stricture, right    Arthritis    Abnormal finding on GI tract imaging    Frequent UTI    Hiatal hernia    Retroperitoneal mass  Right    S/P hip replacement, right  2010    History of bilateral knee replacement  2006, 2008    History of transurethral prostatectomy  2010    History of cystoscopy  Right Ureteroscopy, stent insertion ---9/2021    History of strabismus surgery  Bilateral as a child    Bilateral cataracts    S/P ureteral stent placement  and exchange 1/ 2022        MEDICATIONS  (STANDING):  amLODIPine   Tablet 10 milliGRAM(s) Oral daily  enoxaparin Injectable 40 milliGRAM(s) SubCutaneous every 24 hours  pantoprazole  Injectable 40 milliGRAM(s) IV Push two times a day  timolol 0.5% Solution 1 Drop(s) Both EYES two times a day    MEDICATIONS  (PRN):  acetaminophen  Suppository .. 650 milliGRAM(s) Rectal every 4 hours PRN Mild Pain (1 - 3)  hydrALAZINE Injectable 10 milliGRAM(s) IV Push every 6 hours PRN sbp>140  lidocaine 2% Viscous 5 milliLiter(s) Swish and Spit every 6 hours PRN Mouth Care  morphine  - Injectable 2 milliGRAM(s) IV Push every 4 hours PRN Moderate Pain (4 - 6)  morphine  - Injectable 2 milliGRAM(s) IV Push every 2 hours PRN Severe Pain (7 - 10)      Allergies    adhesives (Rash)  No Known Drug Allergies    Intolerances        REVIEW OF SYSTEMS:    CONSTITUTIONAL: No weakness, fevers or chills  RESPIRATORY: No cough, wheezing, hemoptysis; No shortness of breath  CARDIOVASCULAR: No chest pain or palpitations  GASTROINTESTINAL: as above  All other review of systems is negative unless indicated above.    Vital Signs Last 24 Hrs  T(C): 36.9 (02 Apr 2022 21:12), Max: 36.9 (02 Apr 2022 10:14)  T(F): 98.4 (02 Apr 2022 21:12), Max: 98.5 (02 Apr 2022 10:14)  HR: 71 (02 Apr 2022 22:14) (69 - 72)  BP: 149/62 (02 Apr 2022 22:14) (149/62 - 171/77)  BP(mean): 85 (02 Apr 2022 22:14) (85 - 85)  RR: 18 (02 Apr 2022 21:12) (18 - 18)  SpO2: 97% (02 Apr 2022 21:12) (97% - 98%)    PHYSICAL EXAM:  Constitutional: NAD, thin male  Gastrointestinal: BS+, soft, NT/ND

## 2022-04-03 NOTE — PROGRESS NOTE ADULT - ASSESSMENT
88yo male with advanced rp cancer    advance diet  social work  and discharge planning    stent diet in chart

## 2022-04-04 DIAGNOSIS — E78.00 PURE HYPERCHOLESTEROLEMIA, UNSPECIFIED: ICD-10-CM

## 2022-04-04 DIAGNOSIS — I10 ESSENTIAL (PRIMARY) HYPERTENSION: ICD-10-CM

## 2022-04-04 LAB — SARS-COV-2 RNA SPEC QL NAA+PROBE: SIGNIFICANT CHANGE UP

## 2022-04-04 PROCEDURE — 99233 SBSQ HOSP IP/OBS HIGH 50: CPT

## 2022-04-04 PROCEDURE — 99232 SBSQ HOSP IP/OBS MODERATE 35: CPT

## 2022-04-04 PROCEDURE — 99497 ADVNCD CARE PLAN 30 MIN: CPT

## 2022-04-04 PROCEDURE — 99498 ADVNCD CARE PLAN ADDL 30 MIN: CPT

## 2022-04-04 RX ORDER — MORPHINE SULFATE 50 MG/1
2 CAPSULE, EXTENDED RELEASE ORAL EVERY 4 HOURS
Refills: 0 | Status: DISCONTINUED | OUTPATIENT
Start: 2022-04-04 | End: 2022-04-05

## 2022-04-04 RX ORDER — MORPHINE SULFATE 50 MG/1
2 CAPSULE, EXTENDED RELEASE ORAL
Refills: 0 | Status: DISCONTINUED | OUTPATIENT
Start: 2022-04-04 | End: 2022-04-05

## 2022-04-04 RX ORDER — CHLORHEXIDINE GLUCONATE 213 G/1000ML
1 SOLUTION TOPICAL
Refills: 0 | Status: DISCONTINUED | OUTPATIENT
Start: 2022-04-04 | End: 2022-04-05

## 2022-04-04 RX ADMIN — ENOXAPARIN SODIUM 40 MILLIGRAM(S): 100 INJECTION SUBCUTANEOUS at 13:39

## 2022-04-04 RX ADMIN — PANTOPRAZOLE SODIUM 40 MILLIGRAM(S): 20 TABLET, DELAYED RELEASE ORAL at 09:41

## 2022-04-04 RX ADMIN — CHLORHEXIDINE GLUCONATE 1 APPLICATION(S): 213 SOLUTION TOPICAL at 13:21

## 2022-04-04 RX ADMIN — AMLODIPINE BESYLATE 10 MILLIGRAM(S): 2.5 TABLET ORAL at 09:41

## 2022-04-04 RX ADMIN — PANTOPRAZOLE SODIUM 40 MILLIGRAM(S): 20 TABLET, DELAYED RELEASE ORAL at 21:40

## 2022-04-04 RX ADMIN — Medication 1 DROP(S): at 21:50

## 2022-04-04 RX ADMIN — Medication 1 DROP(S): at 09:41

## 2022-04-04 NOTE — CONSULT NOTE ADULT - SUBJECTIVE AND OBJECTIVE BOX
CHIEF COMPLAINT:  Patient is a 87y old  Male who presents with a chief complaint of s/p unsuccessful EUS (2022 10:46)      HPI: 22:  87 y.o. male well know to me with h/o HTN and hyperlipidemia, presets with a large obstructive retroperitoneal mass s/p unsuccessful EUS attempt due to near complete stomach obstruction with decreased po intake and 30lb weight loss, admitted for IR bx and possible stent placement on 28 Mar 2022.  Initially he had an NG tube place with significant output and a duodenal stent ws placed but unable to place a biliary stent due to anatomy.  He is now awaiting palliative care discussion.  He has no new cardiac symptoms.  He's mostly worried about the future care of his wife with dementia.        PMHx:  PAST MEDICAL & SURGICAL HISTORY:  Hypercholesteremia  Hypertension  COVID-19 vaccine series completed-moderna- 2021  Ureteral stricture, right  Arthritis  Abnormal finding on GI tract imaging  Frequent UTI  Hiatal hernia  Retroperitoneal mass-Right  S/P hip replacement, right-  History of bilateral knee replacement-,   History of transurethral prostatectomy-  History of cystoscopy-Right Ureteroscopy, stent insertion ---2021  History of strabismus surgery-Bilateral as a child  Bilateral cataracts  S/P ureteral stent placement and exchange 2022      FAMILY HISTORY:   FAMILY HISTORY:  FH: breast cancer (Mother)-Age 37,   FH: lung cancer-Age 63,       ALLERGIES:  Allergies  adhesives (Rash)  No Known Drug Allergies        REVIEW OF SYSTEMS:  10 point ROS was obtained  Pertinent positives and negatives are as above  All other review of systems is negative unless indicated above      Vital Signs Last 24 Hrs  T(C): 36.7 (2022 21:29), Max: 36.8 (2022 15:27)  T(F): 98 (2022 21:29), Max: 98.2 (2022 15:27)  HR: 76 (2022 21:29) (76 - 83)  BP: 158/78 (2022 21:29) (148/85 - 158/78)  BP(mean): --  RR: 18 (2022 21:29) (17 - 18)  SpO2: 98% (2022 21:29) (97% - 98%)    I&O's Summary    2022 07:01  -  2022 07:00  --------------------------------------------------------  IN: 240 mL / OUT: 1700 mL / NET: -1460 mL        CAPILLARY BLOOD GLUCOSE          PHYSICAL EXAM:   Constitutional: NAD, awake and alert, well-developed  HEENT: PERR, EOMI, Normal Hearing, MMM  Neck: Soft and supple, No LAD, No JVD  Respiratory: Breath sounds are clear bilaterally, No wheezing, rales or rhonchi  Cardiovascular: S1 and S2, regular rate and rhythm, soft MARIELA at LLSB and base as before, no gallops or rubs  Gastrointestinal: Bowel Sounds present, soft, nontender, nondistended, no guarding, no rebound  Extremities: No peripheral edema  Vascular: 2+ peripheral pulses  Neurological: A/O x 3, no focal deficits  Musculoskeletal: 5/5 strength b/l upper and lower extremities  Skin: No rashes    MEDICATIONS:  MEDICATIONS  (STANDING):  amLODIPine   Tablet 10 milliGRAM(s) Oral daily  enoxaparin Injectable 40 milliGRAM(s) SubCutaneous every 24 hours  pantoprazole  Injectable 40 milliGRAM(s) IV Push two times a day  timolol 0.5% Solution 1 Drop(s) Both EYES two times a day      LABS: All Labs Reviewed:  Comprehensive Metabolic Panel in AM (22 @ 06:38)   Sodium, Serum: 141 mmol/L   Potassium, Serum: 3.8 mmol/L   Chloride, Serum: 111 mmol/L   Carbon Dioxide, Serum: 24 mmol/L   Anion Gap, Serum: 6 mmol/L   Blood Urea Nitrogen, Serum: 46 mg/dL   Creatinine, Serum: 1.35 mg/dL   Glucose, Serum: 124 mg/dL   Calcium, Total Serum: 8.6 mg/dL   Protein Total, Serum: 5.8 gm/dL   Albumin, Serum: 2.3 g/dL   Bilirubin Total, Serum: 6.0 mg/dL   Alkaline Phosphatase, Serum: 969 U/L   Aspartate Aminotransferase (AST/SGOT): 144 U/L   Alanine Aminotransferase (ALT/SGPT): 150 U/L   Magnesium, Serum in AM (22 @ 06:38)   Magnesium, Serum: 2.3 mg/dL     Complete Blood Count + Automated Diff (22 @ 06:11)   WBC Count: 10.16 K/uL   RBC Count: 4.00 M/uL   Hemoglobin: 12.0 g/dL   Hematocrit: 36.2 %         BLOOD CULTURES:   LIPID PROFILE     RADIOLOGY:      EKG:      TELEMETRY:      ECHO:

## 2022-04-04 NOTE — PROGRESS NOTE ADULT - NUTRITIONAL ASSESSMENT
This patient has been assessed with a concern for Malnutrition and has been determined to have a diagnosis/diagnoses of Severe protein-calorie malnutrition and Underweight (BMI < 19).    This patient is being managed with:   Diet Soft and Bite Sized-  Entered: Apr  3 2022  9:18AM    
This patient has been assessed with a concern for Malnutrition and has been determined to have a diagnosis/diagnoses of Severe protein-calorie malnutrition and Underweight (BMI < 19).    This patient is being managed with:   Parenteral Nutrition - Adult-  Entered: Apr 1 2022 10:00PM    fat emulsion (Fish Oil and Plant Based) 20% Infusion-[Known as SMOFLIPID 20% Infusion]  50 Gram(s) in IV Solution 250 milliLiter(s) infuse at 20.8 mL/Hr  Dose Rate: 0.6887 Gm/kG/Day Infuse Over: 12 Hours; Stop After 12 Hours  Administration Instructions: Use 1.2 micron in-line filter  Entered: Apr 1 2022 10:00PM    fat emulsion (Fish Oil and Plant Based) 20% Infusion-[Known as SMOFLIPID 20% Infusion]  50 Gram(s) in IV Solution 250 milliLiter(s) infuse at 20.8 mL/Hr  Dose Rate: 0.6887 Gm/kG/Day Infuse Over: 12 Hours; Stop After 12 Hours  Administration Instructions: Use 1.2 micron in-line filter  Entered: Mar 31 2022 10:00PM    Parenteral Nutrition - Adult-  Entered: Mar 31 2022 10:00PM    Diet NPO after Midnight-     NPO Start Date: 31-Mar-2022   NPO Start Time: 23:59  Except Medications  Entered: Mar 31 2022  7:40AM    Diet NPO-  With Ice Chips/Sips of Water  Entered: Mar 28 2022  2:31PM    
This patient has been assessed with a concern for Malnutrition and has been determined to have a diagnosis/diagnoses of Severe protein-calorie malnutrition and Underweight (BMI < 19).    This patient is being managed with:   fat emulsion (Fish Oil and Plant Based) 20% Infusion-[Known as SMOFLIPID 20% Infusion]  50 Gram(s) in IV Solution 250 milliLiter(s) infuse at 20.8 mL/Hr  Dose Rate: 0.6887 Gm/kG/Day Infuse Over: 12 Hours; Stop After 12 Hours  Administration Instructions: Use 1.2 micron in-line filter  Entered: Mar 31 2022 10:00PM    Parenteral Nutrition - Adult-  Entered: Mar 31 2022 10:00PM    Diet NPO after Midnight-     NPO Start Date: 31-Mar-2022   NPO Start Time: 23:59  Except Medications  Entered: Mar 31 2022  7:40AM    Parenteral Nutrition - Adult-  Entered: Mar 30 2022 10:00PM    Diet NPO-  With Ice Chips/Sips of Water  Entered: Mar 28 2022  2:31PM    
This patient has been assessed with a concern for Malnutrition and has been determined to have a diagnosis/diagnoses of Severe protein-calorie malnutrition and Underweight (BMI < 19).    This patient is being managed with:   Diet Full Liquid-  Entered: Apr 2 2022  8:04AM    Parenteral Nutrition - Adult-  Entered: Apr 1 2022 10:00PM    fat emulsion (Fish Oil and Plant Based) 20% Infusion-[Known as SMOFLIPID 20% Infusion]  50 Gram(s) in IV Solution 250 milliLiter(s) infuse at 20.8 mL/Hr  Dose Rate: 0.6887 Gm/kG/Day Infuse Over: 12 Hours; Stop After 12 Hours  Administration Instructions: Use 1.2 micron in-line filter  Entered: Apr 1 2022 10:00PM    
This patient has been assessed with a concern for Malnutrition and has been determined to have a diagnosis/diagnoses of Severe protein-calorie malnutrition and Underweight (BMI < 19).    This patient is being managed with:   Parenteral Nutrition - Adult-  Entered: Mar 30 2022 10:00PM    Parenteral Nutrition - Adult-  Entered: Mar 29 2022 10:00PM    Diet NPO-  With Ice Chips/Sips of Water  Entered: Mar 28 2022  2:31PM    
This patient has been assessed with a concern for Malnutrition and has been determined to have a diagnosis/diagnoses of Severe protein-calorie malnutrition and Underweight (BMI < 19).    This patient is being managed with:   Diet Soft and Bite Sized-  Entered: Apr  3 2022  9:18AM

## 2022-04-04 NOTE — PROGRESS NOTE ADULT - CONVERSATION DETAILS
We discussed Palliative Care team being a supportive team when a patient has ongoing illnesses.  We also discussed that it is not an end of life care service, but can help navigate symptoms and emotional support throughout their hospital stay here.     Hospice was explained as well as an end of life care philosophy. When a disease cannot be cured, or family/patient decide the treatment burdens out weight the risk and chose to change focus of treatment from cure to quality/comfort.       Long discussion with family at this stage, we are recommending comfort measures as patient continues to decline regardless of maximal support.  We discussed prolonging suffering rather than prolonging life.     We discussed what that would mean,  no longer doing blood tests, dx imaging, abx, and palliative extubation to RA with comfort medications to address any symptoms that may arise. This would mean shortened life span but would focus on comfort and quality at the end of life.       Mr. Aguirre was frustrated and felt that he just wanted to stay in the hospital until his time came or his symptoms weren't managed well.  He did ask we reach out to his children to further discuss.    We did call [facility] to discuss at length.  We discussed different hospice situations such as home, vs. in patient unit, vs. snf w/ comfort.

## 2022-04-04 NOTE — PROGRESS NOTE ADULT - SUBJECTIVE AND OBJECTIVE BOX
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   Had NGT placed with significant outpt. Now s/p duodenal stent placement.     Interval History: 4/4/22 pt seen and examined sitting in chair at bedside, reports early satiety, poor appetite, ongoing weakness and fatigue. No BM x 7 days, voiding without difficulty, urine is clear tea colored. Denies CP, palps, sob, HA, dizziness, n/v/d, dysuria, fever or chills.     REVIEW OF SYSTEMS: All other review of systems is negative unless indicated above.    PHYSICAL EXAM:  Vital Signs Last 24 Hrs: all reviewed   T(C): 36.6 (04 Apr 2022 09:08), Max: 36.7 (03 Apr 2022 21:29)  T(F): 97.8 (04 Apr 2022 09:08), Max: 98 (03 Apr 2022 21:29)  HR: 56 (04 Apr 2022 09:08) (56 - 76)  BP: 145/82 (04 Apr 2022 09:08) (145/82 - 158/78)  RR: 17 (04 Apr 2022 09:08) (17 - 18)  SpO2: 98% (04 Apr 2022 09:08) (98% - 98%)    GENERAL: Comfortable, no acute distress, jaundiced, cachectic.   HEAD:  Atraumatic, Normocephalic  EYES: EOMI, PERRLA, +Scleral icterus  HEENT: moist mm  NECK: Supple, No JVD  NERVOUS SYSTEM:  Alert & Oriented X3, Motor Strength 5/5 B/L upper and lower extremities  CHEST/LUNG: Clear to auscultation bilaterally  HEART: Regular rate and rhythm; No murmurs, rubs, or gallops  ABDOMEN: Soft, Nontender, Nondistended; Bowel sounds present  GENITOURINARY- Voiding, no palpable bladder  EXTREMITIES:  No clubbing, cyanosis, bilateral le edema+  MUSCULOSKELETAL- No muscle tenderness, No joint tenderness  SKIN-no rash    LABS:   none today    Radiology/EKG: all reviewed:     Xray UGI Single Contrast Study (03.15.22 @ 11:11)   IMPRESSION:  Severely narrowed second portionof the duodenum corresponding to the area of tumor encasement seen on recent CT scan. Intrathoracic stomach with non-obstructing gastric volvulus.  < end of copied text >    12 Lead ECG (01.24.22 @ 12:03)   Ventricular Rate 67 BPM  Atrial Rate 67 BPM  P-R Interval 344 ms  QRS Duration 96 ms  Q-T Interval 408 ms  QTC Calculation(Bazett) 431 ms  P Axis -13 degrees  R Axis 71 degrees  T Axis 11 degrees  Sinus rhythm with sinus arrhythmia with 1st degree A-V block  Septal infarct , age undetermined  Abnormal ECG  When compared with ECG of 15-SIDDHARTHA-2015 07:40, Septal infarct is now Present  < end of copied text >    Upper Endoscopy (04.01.22 @ 14:59)   Findings:  The examined esophagus was normal. A 6 cm hiatal hernia was present. An NG tube was present and removed. A severe stenosis due to extrinsic compression causing near complete   obstruction was found in the first portion of the duodenum and was non-traversed. A 0.035 inch x 450 cm Stiff Hydra Jagwire was placed with difficulty across the narrowing using a Swing Tip catheter. Using a 12 mm balloon with contrast, the stricture was outlined. This was stented with a 22 mm x 120 mm WallFlex duodenal stent under fluoroscopic guidance.  Standard ERCP unable to be performed due to papilla being within the stricture.  Impression: Successful stent across duodenal obstruction using a 22 mm x 120 mm stent.  < end of copied text >    Meds:  MEDICATIONS  (STANDING):  amLODIPine   Tablet 10 milliGRAM(s) Oral daily  chlorhexidine 4% Liquid 1 Application(s) Topical <User Schedule>  enoxaparin Injectable 40 milliGRAM(s) SubCutaneous every 24 hours  pantoprazole  Injectable 40 milliGRAM(s) IV Push two times a day  timolol 0.5% Solution 1 Drop(s) Both EYES two times a day    MEDICATIONS  (PRN):  acetaminophen  Suppository .. 650 milliGRAM(s) Rectal every 4 hours PRN Mild Pain (1 - 3)  hydrALAZINE Injectable 10 milliGRAM(s) IV Push every 6 hours PRN sbp>140  lidocaine 2% Viscous 5 milliLiter(s) Swish and Spit every 6 hours PRN Mouth Care  morphine  - Injectable 2 milliGRAM(s) IV Push every 4 hours PRN Moderate Pain (4 - 6)  morphine  - Injectable 2 milliGRAM(s) IV Push every 2 hours PRN Severe Pain (7 - 10)

## 2022-04-04 NOTE — CONSULT NOTE ADULT - CONSULT REQUESTED BY NAME
Initial Anesthesia Post-op Note    Patient: Rachel Patel  Procedure(s) Performed: LAPAROSCOPIC SIGMOID COLON RESECTIONCOLONOSCOPY  Anesthesia type: General    Vital Last Value   Temperature 36.7 °C (98 °F) (08/21/17 0609)   Pulse 62 (08/21/17 0609)   Respiratory Rate 16 (08/21/17 0609)   Non-Invasive   Blood Pressure 127/66 (08/21/17 0609)   Arterial  Blood Pressure     Pulse Oximetry 97 % (08/21/17 0609)     Last 24 I/O:   Intake/Output Summary (Last 24 hours) at 08/21/17 1219  Last data filed at 08/21/17 1156   Gross per 24 hour   Intake             3700 ml   Output              275 ml   Net             3425 ml       PATIENT LOCATION: PACU Phase 1  POST-OP VITAL SIGNS: stable  LEVEL OF CONSCIOUSNESS: sedated and awake  RESPIRATORY STATUS: spontaneous ventilation, unassisted and nasal cannula  CARDIOVASCULAR: blood pressure returned to baseline and stable  HYDRATION: euvolemic    PAIN MANAGEMENT: adequately controlled  HANDOFF:  Handoff to receiving nurse was performed and questions were answered      
Poncho Magallon
Naun
Hailey Freeman
Dr. Mcmahon
Pt requested

## 2022-04-04 NOTE — PROGRESS NOTE ADULT - SUBJECTIVE AND OBJECTIVE BOX
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   Had NGT placed with significant outpt.     3/30/22  pt seen and examined  NGT in place  pt in NAD no nausea vomiting sob   frail appearing, reports generalized weakness    3/31  pt seen and examined, in NAD  awaiting procedure  no pain or nausea or vomiting      4/4/22  pt seen and examined  hes in no acute distress  Stent has been placed and he's been progressed to soft foods   He's frustrated as he doesn't want to leave the hospital and rather would stay as long as possible in the hospital   He does make clear he knows he has no cure, he doesn't want chemo (and currently not a safe option).  He understands we're talking about moving forward with a comfort approach and the delivery for this care would best be .      PAIN: ( )Yes   (x )No   DYSPNEA: ( ) Yes  (x ) No  Review of Systems:    Anxiety- none  Depression-none  Physical Discomfort-  frustrated, but no complaints of discomfort  Dyspnea-none  Constipation- none  Diarrhea-none  Nausea-none  Vomiting-none  Anorexia- +++  Weight Loss-  +++  Cough-none  Secretions-none  Fatigue-+++  Weakness- ++  Delirium- none    All other systems reviewed and negative       PHYSICAL EXAM:    Vital Signs Last 24 Hrs  T(C): 36.6 (04 Apr 2022 09:08), Max: 36.8 (03 Apr 2022 15:27)  T(F): 97.8 (04 Apr 2022 09:08), Max: 98.2 (03 Apr 2022 15:27)  HR: 56 (04 Apr 2022 09:08) (56 - 78)  BP: 145/82 (04 Apr 2022 09:08) (145/82 - 158/78)  BP(mean): --  RR: 17 (04 Apr 2022 09:08) (17 - 18)  SpO2: 98% (04 Apr 2022 09:08) (97% - 98%)  Daily     Daily     PPSV2: 30  %  FAST:    General: nad   HEENT: eomi  Lungs: ctabl  Cardiac: s1s2  GI: soft nontender  : voids  Ext: moves all 4 extremities spontaneously  Neuro: follows simple commands      LABS:            Albumin: Albumin, Serum: 2.3 g/dL (04-01 @ 06:38)      Allergies    adhesives (Rash)  No Known Drug Allergies    Intolerances      MEDICATIONS  (STANDING):  amLODIPine   Tablet 10 milliGRAM(s) Oral daily  enoxaparin Injectable 40 milliGRAM(s) SubCutaneous every 24 hours  pantoprazole  Injectable 40 milliGRAM(s) IV Push two times a day  timolol 0.5% Solution 1 Drop(s) Both EYES two times a day    MEDICATIONS  (PRN):  acetaminophen  Suppository .. 650 milliGRAM(s) Rectal every 4 hours PRN Mild Pain (1 - 3)  hydrALAZINE Injectable 10 milliGRAM(s) IV Push every 6 hours PRN sbp>140  lidocaine 2% Viscous 5 milliLiter(s) Swish and Spit every 6 hours PRN Mouth Care  morphine  - Injectable 2 milliGRAM(s) IV Push every 4 hours PRN Moderate Pain (4 - 6)  morphine  - Injectable 2 milliGRAM(s) IV Push every 2 hours PRN Severe Pain (7 - 10)      RADIOLOGY:

## 2022-04-04 NOTE — PROGRESS NOTE ADULT - ASSESSMENT
87m, PMH as above a/w:    Duodenal obstruction d/t large RP mass/severe protein calorie malnutrition  - s/p duodenal stent  -diet advanced to stent diet  -PPI  -off PPN  -physical therapy/ambulate     Elevated LFTs  -d/t obstruction  -outpt ct showed intrahepatic biliary ductal dilation + CBD dilation to 1.9cm @ level of RP mass  -unable to place biliary stent d/t anatomy    Renal failure/CKD III:  -likely partly obstructive  -had right ureteral stent placed prior to admission   -also has stones left UV junction without hydro    HTN  -resumed Norvasc  -continue prn iv hydralazine    HLD  -off statin    Glaucoma  -continue gtts    DVT Ppx: Lovenox    Code Status: DNR/DNI     DISPO: [facility] to [facility] tomorrow, Covid swab pending

## 2022-04-04 NOTE — PROGRESS NOTE ADULT - ASSESSMENT
Process of Care  --Reviewed dx/treatment problems and alignment with Goals of Care    Physical Aspects of Care  --Pain  patient denies at this time  c/w Morphine 2mg IV [id number] PRN       --Bowel Regimen  dulcolax MN [id number] hold for BM    --Dyspnea  No SOB at this time  comfortable and in NAD  morphine as above can be used for dyspnea as well    --Nausea Vomiting  denies  Zofran [id number] PRN     --Weakness  PT as tolerated     --Delirium ppx  -Initiate melatonin 5mg qhs, to promote maintenance of circadian rhythm/restful sleep, and for ppx of future susceptibility to delirium upon resolution of presenting condition.  -Avoid deliriogenic agents including BZD, anticholinergics, and sedating agents if possible  -Re-orient frequently, encourage family at bedside as able.   -Early mobilization recommended if feasible.   Psychological and Psychiatric Aspects of Care:   Grief Bereavement emotional support provided  --Hx of psychiatric dx: none  -Pastoral Care Available PRN     Social Aspects of Care  -SW involved     Cultural Aspects  -Primary Language: English    Goals of Care:     We discussed Palliative Care team being a supportive team when a patient has ongoing illnesses.  We also discussed that it is not an end of life care service, but can help navigate symptoms and emotional support througout their hospital stay here.    Hospice was explained as well  as an end of life care philosophy.  When a disease cannot be cured, or family/patient decide the treatment burdens out weigh the risk and one choses to change focus of treatment from cure to quality/comfort.     Please refer to [facility] for further details    3/31  pt remains comfortable  awaiting procedure  tired but in NAD  no pain or nausea at this time  no changes in current goals of care      4/4/22  Please refer to [facility] for full details.   Pt is DNR DNI at this time  Long discussions regarding comfort as above  Refer to Hospice discussed family is hoping for in patient unit.       Capacity: Full capacity for complex decision making  HCP/Surrogate: Anahy (daughter) and Trell (son)  Code Status: DNR DNI NO FEEDING TUBE  MOLST: ON CHART  Dispo Plan: DC planning for comfort SNF vs. [facility] hospice (which has been explained as a temporary stay)    Discussed With: Case coordinated with attending and SW and RN     Time Spent: 75 minutes including the care, coordination and counseling of this patient, 50% of which was spent coordinating and counseling.

## 2022-04-04 NOTE — CONSULT NOTE ADULT - ASSESSMENT
4/4/22:  Pt with above history with DNI/DNR in place.  Awaiting discussion with palliative care.  No indication fro fourther cardiac testing and continue as per medicine, GI, and palliative care etal.  Will follow as needed.

## 2022-04-04 NOTE — CHART NOTE - NSCHARTNOTESELECT_GEN_ALL_CORE
Dietitian Brief Note
Event Note
Event Note
PPN order
Event Note
Event Note
Interventional radiology/Event Note
PPN order

## 2022-04-04 NOTE — PROGRESS NOTE ADULT - NS ATTEND AMEND GEN_ALL_CORE FT
Appreciate ACP help.  Pt seen and examined with WYATT Mendez; all labs and imaging reviewed together. POC discussed.  The above documentation reviewed by me and contains my recommendations.

## 2022-04-04 NOTE — CHART NOTE - NSCHARTNOTEFT_GEN_A_CORE
Dr. Harrington and this SW met with pt. at bedside to follow up. Pt. discussed how he knows his time is limited and he would like to spend the remainder of his time in the hospital as opposed to hospice. Pt. clearly stated he could not return home. We explained why pt. could not remain in the hospital once pt. was medically cleared, as insurance would not cover this. We discussed inpatient hospice and what this would look like ( also explained he would have to be accepted as it is up to hospice). Pt. shared that his doctors told him they were looking into seeing if pt. could remain here (even thought we explained it is not an option) however, if it he absolutely had to he would consider hospice. Pt. gave team permission to discuss plan with his children Trell and Anahy and reports that Trell makes the plans.     Team spoke with pts children via phone. We discussed about and explained why pt. could not remain in the hospital. We reviewed inpatient hospice and its criteria. Team discussed that it will ultimately be up to Hospice to decide if they are able to take pt. in their inpatient facility but we can refer and see what they say. The other option of LTC with a comfort MOLST was also discussed if pt. was not accepted to inpatient hospice. Pts wife is currently in [place] and they would be open to that if pt. could not go to hospice. They would like referral sent to [facility] to see if pt. is accepted.    Plan at this time is for referral to [facility]. Floor SW to place referral. Emotional support provided. Our team will continue to follow.

## 2022-04-05 ENCOUNTER — TRANSCRIPTION ENCOUNTER (OUTPATIENT)
Age: 87
End: 2022-04-05

## 2022-04-05 VITALS
HEART RATE: 69 BPM | DIASTOLIC BLOOD PRESSURE: 65 MMHG | TEMPERATURE: 98 F | SYSTOLIC BLOOD PRESSURE: 133 MMHG | OXYGEN SATURATION: 99 % | RESPIRATION RATE: 18 BRPM

## 2022-04-05 PROCEDURE — 99239 HOSP IP/OBS DSCHRG MGMT >30: CPT

## 2022-04-05 RX ORDER — ONDANSETRON 8 MG/1
4 TABLET, FILM COATED ORAL
Qty: 0 | Refills: 0 | DISCHARGE

## 2022-04-05 RX ORDER — AMLODIPINE BESYLATE 2.5 MG/1
1 TABLET ORAL
Qty: 0 | Refills: 0 | DISCHARGE

## 2022-04-05 RX ORDER — ACETAMINOPHEN 500 MG
2 TABLET ORAL
Qty: 0 | Refills: 0 | DISCHARGE

## 2022-04-05 RX ORDER — ALPRAZOLAM 0.25 MG
1 TABLET ORAL
Qty: 0 | Refills: 0 | DISCHARGE

## 2022-04-05 RX ORDER — ACETAMINOPHEN 500 MG
1 TABLET ORAL
Qty: 0 | Refills: 0 | DISCHARGE
Start: 2022-04-05

## 2022-04-05 RX ORDER — SIMVASTATIN 20 MG/1
1 TABLET, FILM COATED ORAL
Qty: 0 | Refills: 0 | DISCHARGE

## 2022-04-05 RX ORDER — MORPHINE SULFATE 50 MG/1
2 CAPSULE, EXTENDED RELEASE ORAL
Qty: 0 | Refills: 0 | DISCHARGE
Start: 2022-04-05

## 2022-04-05 RX ORDER — ASPIRIN/CALCIUM CARB/MAGNESIUM 324 MG
1 TABLET ORAL
Qty: 0 | Refills: 0 | DISCHARGE

## 2022-04-05 RX ORDER — TIMOLOL 0.5 %
1 DROPS OPHTHALMIC (EYE)
Qty: 0 | Refills: 0 | DISCHARGE
Start: 2022-04-05

## 2022-04-05 RX ORDER — TIMOLOL 0.5 %
1 DROPS OPHTHALMIC (EYE)
Qty: 0 | Refills: 0 | DISCHARGE

## 2022-04-05 RX ORDER — LIDOCAINE 4 G/100G
5 CREAM TOPICAL
Qty: 0 | Refills: 0 | DISCHARGE
Start: 2022-04-05

## 2022-04-05 RX ADMIN — AMLODIPINE BESYLATE 10 MILLIGRAM(S): 2.5 TABLET ORAL at 09:54

## 2022-04-05 RX ADMIN — Medication 1 DROP(S): at 09:55

## 2022-04-05 RX ADMIN — PANTOPRAZOLE SODIUM 40 MILLIGRAM(S): 20 TABLET, DELAYED RELEASE ORAL at 09:54

## 2022-04-05 RX ADMIN — ENOXAPARIN SODIUM 40 MILLIGRAM(S): 100 INJECTION SUBCUTANEOUS at 13:15

## 2022-04-05 NOTE — PROGRESS NOTE ADULT - REASON FOR ADMISSION
s/p unsuccessful EUS

## 2022-04-05 NOTE — PROGRESS NOTE ADULT - SUBJECTIVE AND OBJECTIVE BOX
CHIEF COMPLAINT:  Patient is a 87y old  Male who presents with a chief complaint of s/p unsuccessful EUS (2022 10:46)      HPI: 22:  87 y.o. male well know to me with h/o HTN and hyperlipidemia, presets with a large obstructive retroperitoneal mass s/p unsuccessful EUS attempt due to near complete stomach obstruction with decreased po intake and 30lb weight loss, admitted for IR bx and possible stent placement on 28 Mar 2022.  Initially he had an NG tube place with significant output and a duodenal stent ws placed but unable to place a biliary stent due to anatomy.  He is now awaiting palliative care discussion.  He has no new cardiac symptoms.  He's mostly worried about the future care of his wife with dementia.    22:  Pt for inpatient Hospice placement today.  Overall no new complaints or new cardiac changes.        PMHx:  PAST MEDICAL & SURGICAL HISTORY:  Hypercholesteremia  Hypertension  COVID-19 vaccine series completed-moderna- 2021  Ureteral stricture, right  Arthritis  Abnormal finding on GI tract imaging  Frequent UTI  Hiatal hernia  Retroperitoneal mass-Right  S/P hip replacement, right-  History of bilateral knee replacement-,   History of transurethral prostatectomy-  History of cystoscopy-Right Ureteroscopy, stent insertion ---2021  History of strabismus surgery-Bilateral as a child  Bilateral cataracts  S/P ureteral stent placement and exchange 2022      FAMILY HISTORY:   FAMILY HISTORY:  FH: breast cancer (Mother)-Age 37,   FH: lung cancer-Age 63,       ALLERGIES:  Allergies  adhesives (Rash)  No Known Drug Allergies        REVIEW OF SYSTEMS:  10 point ROS was obtained  Pertinent positives and negatives are as above  All other review of systems is negative unless indicated above      Vital Signs Last 24 Hrs  T(C): 36.4 (2022 21:07), Max: 36.6 (2022 09:08)  T(F): 97.6 (2022 21:07), Max: 97.8 (2022 09:08)  HR: 74 (2022 21:07) (56 - 74)  BP: 121/68 (2022 21:07) (121/68 - 145/82)  RR: 18 (2022 21:07) (17 - 18)  SpO2: 99% (2022 21:07) (98% - 99%)      I&O's Summary    2022 07:01  -  2022 07:00  --------------------------------------------------------  IN: 240 mL / OUT: 1700 mL / NET: -1460 mL      PHYSICAL EXAM:   Constitutional: NAD, awake and alert, well-developed  HEENT: PERR, EOMI, Normal Hearing, MMM  Neck: Soft and supple, No LAD, No JVD  Respiratory: Breath sounds are clear bilaterally, No wheezing, rales or rhonchi  Cardiovascular: S1 and S2, regular rate and rhythm, soft MARIELA at LLSB and base as before, no gallops or rubs  Gastrointestinal: Bowel Sounds present, soft, nontender, nondistended, no guarding, no rebound  Extremities: No peripheral edema  Vascular: 2+ peripheral pulses  Neurological: A/O x 3, no focal deficits  Musculoskeletal: 5/5 strength b/l upper and lower extremities  Skin: No rashes      MEDICATIONS  (STANDING):  amLODIPine   Tablet 10 milliGRAM(s) Oral daily  chlorhexidine 4% Liquid 1 Application(s) Topical <User Schedule>  enoxaparin Injectable 40 milliGRAM(s) SubCutaneous every 24 hours  pantoprazole  Injectable 40 milliGRAM(s) IV Push two times a day  timolol 0.5% Solution 1 Drop(s) Both EYES two times a day    MEDICATIONS  (PRN):  acetaminophen  Suppository .. 650 milliGRAM(s) Rectal every 4 hours PRN Mild Pain (1 - 3)  hydrALAZINE Injectable 10 milliGRAM(s) IV Push every 6 hours PRN sbp>140  lidocaine 2% Viscous 5 milliLiter(s) Swish and Spit every 6 hours PRN Mouth Care  morphine  - Injectable 2 milliGRAM(s) IV Push every 4 hours PRN Moderate Pain (4 - 6)  morphine  - Injectable 2 milliGRAM(s) IV Push every 2 hours PRN Severe Pain (7 - 10)      LABS: All Labs Reviewed:  Comprehensive Metabolic Panel in AM (22 @ 06:38)   Sodium, Serum: 141 mmol/L   Potassium, Serum: 3.8 mmol/L   Chloride, Serum: 111 mmol/L   Carbon Dioxide, Serum: 24 mmol/L   Anion Gap, Serum: 6 mmol/L   Blood Urea Nitrogen, Serum: 46 mg/dL   Creatinine, Serum: 1.35 mg/dL   Glucose, Serum: 124 mg/dL   Calcium, Total Serum: 8.6 mg/dL   Protein Total, Serum: 5.8 gm/dL   Albumin, Serum: 2.3 g/dL   Bilirubin Total, Serum: 6.0 mg/dL   Alkaline Phosphatase, Serum: 969 U/L   Aspartate Aminotransferase (AST/SGOT): 144 U/L   Alanine Aminotransferase (ALT/SGPT): 150 U/L   Magnesium, Serum in AM (22 @ 06:38)   Magnesium, Serum: 2.3 mg/dL     Complete Blood Count + Automated Diff (22 @ 06:11)   WBC Count: 10.16 K/uL   RBC Count: 4.00 M/uL   Hemoglobin: 12.0 g/dL   Hematocrit: 36.2 %         BLOOD CULTURES:   LIPID PROFILE     RADIOLOGY:      EKG:      TELEMETRY:      ECHO:

## 2022-04-05 NOTE — DISCHARGE NOTE PROVIDER - HOSPITAL COURSE
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   Had NGT placed with significant outpt. Now s/p duodenal stent placement.     Interval History: 4/5/22 pt seen and examined sitting in chair at bedside, reports early satiety, poor appetite, ongoing weakness and fatigue. + Abdominal tenderness. No BM x 7 days, voiding without difficulty, urine is clear tea colored. Denies CP, palps, sob, HA, dizziness, n/v/d, dysuria, fever or chills.     REVIEW OF SYSTEMS: All other review of systems is negative unless indicated above.    PHYSICAL EXAM:  Vital Signs Last 24 Hrs: all reviewed   T(C): 36.4 (05 Apr 2022 09:36), Max: 36.4 (04 Apr 2022 21:07)  T(F): 97.5 (05 Apr 2022 09:36), Max: 97.6 (04 Apr 2022 21:07)  HR: 71 (05 Apr 2022 09:36) (68 - 74)  BP: 123/62 (05 Apr 2022 09:36) (121/68 - 134/64)  RR: 17 (05 Apr 2022 09:36) (17 - 18)  SpO2: 98% (05 Apr 2022 09:36) (98% - 99%)    GENERAL: Comfortable, no acute distress, jaundiced, cachectic.   HEAD:  Atraumatic, Normocephalic  EYES: EOMI, PERRLA, +Scleral icterus  HEENT: moist mm  NECK: Supple, No JVD  NERVOUS SYSTEM:  Alert & Oriented X3, Motor Strength 5/5 B/L upper and lower extremities  CHEST/LUNG: Clear to auscultation bilaterally  HEART: Regular rate and rhythm; No murmurs, rubs, or gallops  ABDOMEN: Soft, Nontender, Nondistended; Bowel sounds present  GENITOURINARY- Voiding, no palpable bladder  EXTREMITIES:  No clubbing, cyanosis, bilateral le edema+  MUSCULOSKELETAL- No muscle tenderness, No joint tenderness  SKIN-no rash    HOSPITAL COURSE:   Duodenal obstruction d/t large RP mass/severe protein calorie malnutrition  - s/p duodenal stent  -diet advanced to stent diet  -PPI  -off PPN  -physical therapy/ambulate   - DC to hospice   - Start Xanax for Anxiety     Elevated LFTs  -d/t obstruction  -outpt ct showed intrahepatic biliary ductal dilation + CBD dilation to 1.9cm @ level of RP mass  -unable to place biliary stent d/t anatomy    Renal failure/CKD III:  -likely partly obstructive  -had right ureteral stent placed prior to admission   -also has stones left UV junction without hydro    HTN  -resumed Norvasc  -continue prn iv hydralazine    HLD  -off statin    Glaucoma  -continue gtts    Dispo: discharge to  hospice in stable condition    Final diagnosis, treatment plan, and follow-up recommendations were discussed and explained to the patient. The patient was given an opportunity to ask questions concerning the diagnosis and treatment plan. The patient acknowledged understanding of the diagnosis, treatment, and follow-up recommendations. The patient was advised to seek urgent care upon discharge if worsening symptoms develop prior to scheduled follow-up. Time spent on discharge included time with the patient, and also coordinating discharge care as outlined below.    Total time spent: 50 min Chief Complaint: weakness, poor PO intake     HPI: 87M with a PMHx of HTN, HLD, Hiatal hernia, right ureteral stricture s/p stent and exchange who was found to have a large Right sided RP mass (most recently 4.4X2.8X11cm) and with poor po intake, 30 pound weight loss, He had undergone an unsuccessful EUS attempt due to near complete obstruction 3/28 and was referred for admission.   Had NGT placed with significant outpt. Now s/p duodenal stent placement.     Interval History: 4/5/22 pt seen and examined sitting in chair at bedside, reports early satiety, poor appetite, ongoing weakness and fatigue. + Abdominal tenderness.   No BM x 7 days but is passing gas; urine is clear tea colored. Denies CP, palps, sob, HA, dizziness, n/v/d, dysuria, fever or chills.     REVIEW OF SYSTEMS: All other review of systems is negative unless indicated above.    PHYSICAL EXAM:  Vital Signs Last 24 Hrs: all reviewed   T(C): 36.4 (05 Apr 2022 09:36), Max: 36.4 (04 Apr 2022 21:07)  T(F): 97.5 (05 Apr 2022 09:36), Max: 97.6 (04 Apr 2022 21:07)  HR: 71 (05 Apr 2022 09:36) (68 - 74)  BP: 123/62 (05 Apr 2022 09:36) (121/68 - 134/64)  RR: 17 (05 Apr 2022 09:36) (17 - 18)  SpO2: 98% (05 Apr 2022 09:36) (98% - 99%)  GENERAL: Comfortable, no acute distress, jaundiced, cachectic.   HEAD:  Atraumatic, Normocephalic  EYES: EOMI, PERRLA, +Scleral icterus  HEENT: moist mm  NECK: Supple, No JVD  NERVOUS SYSTEM:  Alert & Oriented X3, Motor Strength 5/5 B/L upper and lower extremities  CHEST/LUNG: Clear to auscultation bilaterally  HEART: Regular rate and rhythm; No murmurs, rubs, or gallops  ABDOMEN: Soft, Nontender, Nondistended; Bowel sounds present  GENITOURINARY- Voiding, no palpable bladder  EXTREMITIES:  No clubbing, cyanosis, bilateral le edema+  MUSCULOSKELETAL- No muscle tenderness, No joint tenderness      HOSPITAL COURSE:   Duodenal obstruction d/t large RP mass/severe protein calorie malnutrition  - s/p duodenal stent  -diet advanced to stent diet  -PPI  -off PPN  -physical therapy/ambulate   - DC to hospice   - Start Xanax for Anxiety     Elevated LFTs  -d/t obstruction  -outpt ct showed intrahepatic biliary ductal dilation + CBD dilation to 1.9cm @ level of RP mass  -unable to place biliary stent d/t anatomy    Renal failure/CKD III:  -likely partly obstructive  -had right ureteral stent placed prior to admission   -also has stones left UV junction without hydro    HTN  -resumed Norvasc  -continue prn iv hydralazine    HLD  -off statin    Glaucoma  -continue gtts    Dispo: discharge to  hospice in stable condition    Final diagnosis, treatment plan, and follow-up recommendations were discussed and explained to the patient. The patient was given an opportunity to ask questions concerning the diagnosis and treatment plan. The patient acknowledged understanding of the diagnosis, treatment, and follow-up recommendations. The patient was advised to seek urgent care upon discharge if worsening symptoms develop prior to scheduled follow-up. Time spent on discharge included time with the patient, and also coordinating discharge care as outlined below.    Total time spent: 50 min    Appreciate ACP help.  Pt seen and examined with NP Lynne Mendez; all labs and imaging reviewed together. POC discussed with patient.  The above documentation reviewed by me and contains my recommendations.

## 2022-04-05 NOTE — DISCHARGE NOTE PROVIDER - NSDCMRMEDTOKEN_GEN_ALL_CORE_FT
acetaminophen 650 mg rectal suppository: 1 suppository(ies) rectal every 4 hours, As needed, Mild Pain (1 - 3)  Ambien 10 mg oral tablet: 1 tab(s) orally once a day (at bedtime), As Needed  lidocaine 2% mucous membrane solution: 5 milliliter(s) mucous membrane every 6 hours, As Needed  morphine: 2 milligram(s) intracavernously every 2 hours, As Needed for pain   ondansetron: 4 milligram(s) injectable (IV) every 4 hours, As Needed  timolol maleate 0.5% ophthalmic solution: 1 drop(s) to each affected eye 2 times a day  Xanax 0.25 mg oral tablet: 1 tab(s) orally 3 times a day, As Needed for anxiety

## 2022-04-05 NOTE — DISCHARGE NOTE NURSING/CASE MANAGEMENT/SOCIAL WORK - NSDCPEFALRISK_GEN_ALL_CORE
For information on Fall & Injury Prevention, visit: [email or web address] OR  [email or web address] OR  https://www.cdc.gov/steadi/patient.html

## 2022-04-05 NOTE — PROGRESS NOTE ADULT - PROVIDER SPECIALTY LIST ADULT
Hospitalist
Gastroenterology
Hospitalist
Surgery
Surgery
Hospitalist
Palliative Care
Surgery
Gastroenterology
Hospitalist
Surgery
Hospitalist
Palliative Care
Cardiology

## 2022-04-05 NOTE — DISCHARGE NOTE NURSING/CASE MANAGEMENT/SOCIAL WORK - PATIENT PORTAL LINK FT
You can access the FollowMyHealth Patient Portal offered by [facility] by registering at the following website: [email or web address]. By joining [employer] [facility] [employer]’s FollowMyHealth portal, you will also be able to view your health information using other applications (apps) compatible with our system.

## 2022-04-05 NOTE — PROGRESS NOTE ADULT - ASSESSMENT
4/4/22:  Pt with above history with DNI/DNR in place.  Awaiting discussion with palliative care.  No indication fro fourther cardiac testing and continue as per medicine, GI, and palliative care etal.  Will follow as needed.    4/5/22:  Pt for inpatient Hospice placement today.  Overall no new complaints or new cardiac changes.  To Hospice as planned.

## 2022-04-05 NOTE — DISCHARGE NOTE PROVIDER - NSDCCPCAREPLAN_GEN_ALL_CORE_FT
PRINCIPAL DISCHARGE DIAGNOSIS  Diagnosis: Retroperitoneal mass  Assessment and Plan of Treatment: - With Duodenal obstruction s/p duodenal stent  - Stent Diet (moist and bite sized)   - Unable to place biliary stent due to anatomy  - Morphine and Xanax PRN for pain and anxiety      SECONDARY DISCHARGE DIAGNOSES  Diagnosis: Acute on chronic renal failure  Assessment and Plan of Treatment: - Partially obstructed   - s/p right ureteral stent   - Also has kidney stones (left)     PRINCIPAL DISCHARGE DIAGNOSIS  Diagnosis: Retroperitoneal mass  Assessment and Plan of Treatment: - With Duodenal obstruction s/p palliative duodenal stent  - Stent Diet (moist and bite sized)   - Morphine and Xanax PRN for pain and anxiety      SECONDARY DISCHARGE DIAGNOSES  Diagnosis: Acute on chronic renal failure  Assessment and Plan of Treatment: - Partially obstructed   - s/p right ureteral stent   - Also has kidney stones (left)

## 2022-04-05 NOTE — DISCHARGE NOTE PROVIDER - ATTENDING DISCHARGE PHYSICAL EXAMINATION:
PHYSICAL EXAM:  T(F): 97.5 (05 Apr 2022 09:36), Max: 97.6 (04 Apr 2022 21:07)  HR: 71 (05 Apr 2022 09:36) (71 - 74)  BP: 123/62 (05 Apr 2022 09:36) (121/68 - 123/62)  RR: 17 (05 Apr 2022 09:36) (17 - 18)  SpO2: 98% (05 Apr 2022 09:36) (98% - 99%)  GENERAL: NAD, sitting up in chair, drinking coffee  HEAD:  Atraumatic, Normocephalic  EYES: EOMI, PERRLA, normal sclera  ENT: Moist mucous membranes  NECK: Supple, No JVD, no nuchal rigidity  CHEST/LUNG: Clear to auscultation bilaterally; No rales, rhonchi, wheezing, or rubs. Unlabored respirations  HEART: Regular rate and rhythm; No murmurs, rubs, or gallops  ABDOMEN: Bowel sounds present; Soft, Nontender, Nondistended. No hepatomegaly  EXTREMITIES:  no pitting bilaterally  NERVOUS SYSTEM:  Alert & Oriented X3, speech clear. No focal motor or sensory deficits  MSK: FROM all 4 extremities, muscle atrophy, cachexia with temporal wasting   SKIN: No rashes or lesions

## 2022-04-05 NOTE — DISCHARGE NOTE NURSING/CASE MANAGEMENT/SOCIAL WORK - NSDCVIVACCINE_GEN_ALL_CORE_FT
No Vaccines Administered.
Chronic bronchitis    Dyslipidemia    E-coli UTI    Hypothyroid    Pneumonia

## 2022-04-18 DIAGNOSIS — K31.5 OBSTRUCTION OF DUODENUM: ICD-10-CM

## 2022-04-18 DIAGNOSIS — N18.30 CHRONIC KIDNEY DISEASE, STAGE 3 UNSPECIFIED: ICD-10-CM

## 2022-04-18 DIAGNOSIS — H40.9 UNSPECIFIED GLAUCOMA: ICD-10-CM

## 2022-04-18 DIAGNOSIS — I12.9 HYPERTENSIVE CHRONIC KIDNEY DISEASE WITH STAGE 1 THROUGH STAGE 4 CHRONIC KIDNEY DISEASE, OR UNSPECIFIED CHRONIC KIDNEY DISEASE: ICD-10-CM

## 2022-04-18 DIAGNOSIS — I44.0 ATRIOVENTRICULAR BLOCK, FIRST DEGREE: ICD-10-CM

## 2022-04-18 DIAGNOSIS — Z66 DO NOT RESUSCITATE: ICD-10-CM

## 2022-04-18 DIAGNOSIS — M19.90 UNSPECIFIED OSTEOARTHRITIS, UNSPECIFIED SITE: ICD-10-CM

## 2022-04-18 DIAGNOSIS — N13.5 CROSSING VESSEL AND STRICTURE OF URETER WITHOUT HYDRONEPHROSIS: ICD-10-CM

## 2022-04-18 DIAGNOSIS — I87.1 COMPRESSION OF VEIN: ICD-10-CM

## 2022-04-18 DIAGNOSIS — R79.89 OTHER SPECIFIED ABNORMAL FINDINGS OF BLOOD CHEMISTRY: ICD-10-CM

## 2022-04-18 DIAGNOSIS — E78.5 HYPERLIPIDEMIA, UNSPECIFIED: ICD-10-CM

## 2022-04-18 DIAGNOSIS — N17.9 ACUTE KIDNEY FAILURE, UNSPECIFIED: ICD-10-CM

## 2022-04-18 DIAGNOSIS — E43 UNSPECIFIED SEVERE PROTEIN-CALORIE MALNUTRITION: ICD-10-CM

## 2022-07-06 NOTE — ASU PATIENT PROFILE, ADULT - AS SC BRADEN MOISTURE
Pt lives with spouse in private home, [address] with handrail, 10 steps to bedroom/spouse
(4) rarely moist

## 2025-06-02 NOTE — ASU PATIENT PROFILE, ADULT - PAIN SCALE PREFERRED, PROFILE
Per HH unable to obtain labs. Please encourage HH to continue to re-attempt    Please see how patient is doing with his breathing and swelling.  If symptoms have significantly improved, we could try going back down to 2 mg daily of Bumex since we can't get labs to see how his kidneys are doing, but if still having a lot of edema then I would just continue and hopefully HH can redraw soon   numerical 0-10